# Patient Record
Sex: MALE | Race: WHITE | HISPANIC OR LATINO | Employment: UNEMPLOYED | URBAN - METROPOLITAN AREA
[De-identification: names, ages, dates, MRNs, and addresses within clinical notes are randomized per-mention and may not be internally consistent; named-entity substitution may affect disease eponyms.]

---

## 2017-12-05 ENCOUNTER — APPOINTMENT (OUTPATIENT)
Dept: CT IMAGING | Facility: HOSPITAL | Age: 22
End: 2017-12-05

## 2017-12-05 ENCOUNTER — HOSPITAL ENCOUNTER (INPATIENT)
Facility: HOSPITAL | Age: 22
LOS: 2 days | Discharge: HOME | End: 2017-12-07
Attending: EMERGENCY MEDICINE | Admitting: HOSPITALIST

## 2017-12-05 DIAGNOSIS — F13.930 BENZODIAZEPINE WITHDRAWAL WITHOUT COMPLICATION (HCC): ICD-10-CM

## 2017-12-05 DIAGNOSIS — R56.9 NEW ONSET SEIZURE (HCC): Primary | ICD-10-CM

## 2017-12-05 PROBLEM — L30.9 ECZEMA: Status: ACTIVE | Noted: 2017-12-05

## 2017-12-05 PROBLEM — D72.829 LEUKOCYTOSIS: Status: ACTIVE | Noted: 2017-12-05

## 2017-12-05 PROBLEM — F13.939 BENZODIAZEPINE WITHDRAWAL WITH COMPLICATION (HCC): Status: ACTIVE | Noted: 2017-12-05

## 2017-12-05 PROBLEM — F13.10 BENZODIAZEPINE ABUSE (HCC): Status: ACTIVE | Noted: 2017-12-05

## 2017-12-05 LAB
ALBUMIN SERPL-MCNC: 4.6 G/DL (ref 3.5–5.2)
ALBUMIN/GLOB SERPL: 1.6 G/DL
ALP SERPL-CCNC: 74 U/L (ref 39–117)
ALT SERPL W P-5'-P-CCNC: 11 U/L (ref 1–41)
AMPHET+METHAMPHET UR QL: NEGATIVE
ANION GAP SERPL CALCULATED.3IONS-SCNC: 12.7 MMOL/L
AST SERPL-CCNC: 22 U/L (ref 1–40)
BARBITURATES UR QL SCN: NEGATIVE
BASOPHILS # BLD AUTO: 0.02 10*3/MM3 (ref 0–0.2)
BASOPHILS NFR BLD AUTO: 0.1 % (ref 0–1.5)
BENZODIAZ UR QL SCN: POSITIVE
BILIRUB SERPL-MCNC: 0.5 MG/DL (ref 0.1–1.2)
BILIRUB UR QL STRIP: NEGATIVE
BUN BLD-MCNC: 10 MG/DL (ref 6–20)
BUN/CREAT SERPL: 12.7 (ref 7–25)
CALCIUM SPEC-SCNC: 9.5 MG/DL (ref 8.6–10.5)
CANNABINOIDS SERPL QL: POSITIVE
CHLORIDE SERPL-SCNC: 101 MMOL/L (ref 98–107)
CLARITY UR: CLEAR
CO2 SERPL-SCNC: 26.3 MMOL/L (ref 22–29)
COCAINE UR QL: NEGATIVE
COLOR UR: YELLOW
CREAT BLD-MCNC: 0.79 MG/DL (ref 0.76–1.27)
DEPRECATED RDW RBC AUTO: 39.2 FL (ref 37–54)
EOSINOPHIL # BLD AUTO: 0.02 10*3/MM3 (ref 0–0.7)
EOSINOPHIL NFR BLD AUTO: 0.1 % (ref 0.3–6.2)
ERYTHROCYTE [DISTWIDTH] IN BLOOD BY AUTOMATED COUNT: 12.2 % (ref 11.5–14.5)
ETHANOL BLD-MCNC: <10 MG/DL (ref 0–10)
ETHANOL UR QL: <0.01 %
GFR SERPL CREATININE-BSD FRML MDRD: 125 ML/MIN/1.73
GLOBULIN UR ELPH-MCNC: 2.9 GM/DL
GLUCOSE BLD-MCNC: 98 MG/DL (ref 65–99)
GLUCOSE UR STRIP-MCNC: NEGATIVE MG/DL
HCT VFR BLD AUTO: 45.9 % (ref 40.4–52.2)
HGB BLD-MCNC: 15.9 G/DL (ref 13.7–17.6)
HGB UR QL STRIP.AUTO: NEGATIVE
IMM GRANULOCYTES # BLD: 0.03 10*3/MM3 (ref 0–0.03)
IMM GRANULOCYTES NFR BLD: 0.2 % (ref 0–0.5)
KETONES UR QL STRIP: NEGATIVE
LEUKOCYTE ESTERASE UR QL STRIP.AUTO: NEGATIVE
LYMPHOCYTES # BLD AUTO: 1.13 10*3/MM3 (ref 0.9–4.8)
LYMPHOCYTES NFR BLD AUTO: 7.9 % (ref 19.6–45.3)
MCH RBC QN AUTO: 30.8 PG (ref 27–32.7)
MCHC RBC AUTO-ENTMCNC: 34.6 G/DL (ref 32.6–36.4)
MCV RBC AUTO: 89 FL (ref 79.8–96.2)
METHADONE UR QL SCN: NEGATIVE
MONOCYTES # BLD AUTO: 1.07 10*3/MM3 (ref 0.2–1.2)
MONOCYTES NFR BLD AUTO: 7.5 % (ref 5–12)
NEUTROPHILS # BLD AUTO: 11.96 10*3/MM3 (ref 1.9–8.1)
NEUTROPHILS NFR BLD AUTO: 84.2 % (ref 42.7–76)
NITRITE UR QL STRIP: NEGATIVE
OPIATES UR QL: NEGATIVE
OXYCODONE UR QL SCN: NEGATIVE
PH UR STRIP.AUTO: 8.5 [PH] (ref 5–8)
PLATELET # BLD AUTO: 214 10*3/MM3 (ref 140–500)
PMV BLD AUTO: 10 FL (ref 6–12)
POTASSIUM BLD-SCNC: 3.8 MMOL/L (ref 3.5–5.2)
PROT SERPL-MCNC: 7.5 G/DL (ref 6–8.5)
PROT UR QL STRIP: NEGATIVE
RBC # BLD AUTO: 5.16 10*6/MM3 (ref 4.6–6)
SODIUM BLD-SCNC: 140 MMOL/L (ref 136–145)
SP GR UR STRIP: 1.01 (ref 1–1.03)
UROBILINOGEN UR QL STRIP: ABNORMAL
WBC NRBC COR # BLD: 14.23 10*3/MM3 (ref 4.5–10.7)

## 2017-12-05 PROCEDURE — 80307 DRUG TEST PRSMV CHEM ANLYZR: CPT | Performed by: EMERGENCY MEDICINE

## 2017-12-05 PROCEDURE — 93005 ELECTROCARDIOGRAM TRACING: CPT | Performed by: EMERGENCY MEDICINE

## 2017-12-05 PROCEDURE — 81003 URINALYSIS AUTO W/O SCOPE: CPT | Performed by: EMERGENCY MEDICINE

## 2017-12-05 PROCEDURE — 25010000003 LEVETIRACETAM IN NACL 0.75% 1000 MG/100ML SOLUTION: Performed by: EMERGENCY MEDICINE

## 2017-12-05 PROCEDURE — 25010000002 LORAZEPAM PER 2 MG: Performed by: EMERGENCY MEDICINE

## 2017-12-05 PROCEDURE — 70450 CT HEAD/BRAIN W/O DYE: CPT

## 2017-12-05 PROCEDURE — 99284 EMERGENCY DEPT VISIT MOD MDM: CPT

## 2017-12-05 PROCEDURE — 85025 COMPLETE CBC W/AUTO DIFF WBC: CPT | Performed by: EMERGENCY MEDICINE

## 2017-12-05 PROCEDURE — 93010 ELECTROCARDIOGRAM REPORT: CPT | Performed by: INTERNAL MEDICINE

## 2017-12-05 PROCEDURE — 80053 COMPREHEN METABOLIC PANEL: CPT | Performed by: EMERGENCY MEDICINE

## 2017-12-05 RX ORDER — SODIUM CHLORIDE 450 MG/100ML
75 INJECTION, SOLUTION INTRAVENOUS CONTINUOUS
Status: DISCONTINUED | OUTPATIENT
Start: 2017-12-05 | End: 2017-12-07

## 2017-12-05 RX ORDER — DIAPER,BRIEF,INFANT-TODD,DISP
EACH MISCELLANEOUS EVERY 12 HOURS SCHEDULED
Status: DISCONTINUED | OUTPATIENT
Start: 2017-12-05 | End: 2017-12-07 | Stop reason: HOSPADM

## 2017-12-05 RX ORDER — ALPRAZOLAM 0.5 MG/1
0.25 TABLET ORAL 3 TIMES DAILY
Status: DISCONTINUED | OUTPATIENT
Start: 2017-12-05 | End: 2017-12-07 | Stop reason: HOSPADM

## 2017-12-05 RX ORDER — LORAZEPAM 2 MG/ML
1 INJECTION INTRAMUSCULAR ONCE
Status: COMPLETED | OUTPATIENT
Start: 2017-12-05 | End: 2017-12-05

## 2017-12-05 RX ORDER — LEVETIRACETAM 10 MG/ML
1000 INJECTION INTRAVASCULAR ONCE
Status: COMPLETED | OUTPATIENT
Start: 2017-12-05 | End: 2017-12-05

## 2017-12-05 RX ORDER — IBUPROFEN 400 MG/1
400 TABLET ORAL
COMMUNITY
Start: 2016-11-02

## 2017-12-05 RX ORDER — ACETAMINOPHEN 500 MG
1000 TABLET ORAL ONCE
Status: COMPLETED | OUTPATIENT
Start: 2017-12-05 | End: 2017-12-05

## 2017-12-05 RX ORDER — SODIUM CHLORIDE 0.9 % (FLUSH) 0.9 %
10 SYRINGE (ML) INJECTION AS NEEDED
Status: DISCONTINUED | OUTPATIENT
Start: 2017-12-05 | End: 2017-12-07 | Stop reason: HOSPADM

## 2017-12-05 RX ORDER — SODIUM CHLORIDE 0.9 % (FLUSH) 0.9 %
1-10 SYRINGE (ML) INJECTION AS NEEDED
Status: DISCONTINUED | OUTPATIENT
Start: 2017-12-05 | End: 2017-12-07 | Stop reason: HOSPADM

## 2017-12-05 RX ORDER — ACETAMINOPHEN 325 MG/1
650 TABLET ORAL EVERY 6 HOURS PRN
Status: DISCONTINUED | OUTPATIENT
Start: 2017-12-05 | End: 2017-12-07 | Stop reason: HOSPADM

## 2017-12-05 RX ADMIN — HYDROCORTISONE: 1 OINTMENT TOPICAL at 20:10

## 2017-12-05 RX ADMIN — LEVETIRACETAM 1000 MG: 10 INJECTION INTRAVENOUS at 14:56

## 2017-12-05 RX ADMIN — ALPRAZOLAM 0.25 MG: 0.5 TABLET ORAL at 20:09

## 2017-12-05 RX ADMIN — SODIUM CHLORIDE 100 ML/HR: 4.5 INJECTION, SOLUTION INTRAVENOUS at 18:27

## 2017-12-05 RX ADMIN — LORAZEPAM 1 MG: 2 INJECTION, SOLUTION INTRAMUSCULAR; INTRAVENOUS at 14:51

## 2017-12-05 RX ADMIN — ACETAMINOPHEN 1000 MG: 500 TABLET ORAL at 15:39

## 2017-12-05 RX ADMIN — SODIUM CHLORIDE 1000 ML: 9 INJECTION, SOLUTION INTRAVENOUS at 14:17

## 2017-12-05 RX ADMIN — ACETAMINOPHEN 650 MG: 325 TABLET ORAL at 22:32

## 2017-12-05 NOTE — ED NOTES
Patients bed sheets were changed, patient was given another warm blanket     Jessica Abarca  12/05/17 0148

## 2017-12-05 NOTE — ED PROVIDER NOTES
" EMERGENCY DEPARTMENT ENCOUNTER    CHIEF COMPLAINT  Chief Complaint: seizure-like activity   History given by: pt, girlfriend, parents  History limited by: none  Room Number: 23/23  PMD: No Known Provider      HPI:  Pt is a 22 y.o. male who presents complaining of seizure-like activity. Pt's girlfriend reports that the pt c/o dizziness, fell backwards, hit his head, and had generalized tremors and foaming at the mouth. Pt's girlfriend states that the pt did not speak and was confused for \"a while\" after his seizure-like activity. Pt denies remembering saying that he was dizzy or falling, and denies CP, SOA, or palpitations. Pt also c/o rash that started after rubbing cocoa butter on a new tattoo, and current mild lightheadedness. Pt denies a Hx of seizures. Pt states that he has been using xanax frequently, but not for the past week.    Duration:  Since earlier today   Onset: gradual   Timing: episodic   Location: generalized   Quality: seizure-like activity   Intensity/Severity: moderate   Progression: currently resolved   Associated Symptoms: rash, lightheadedness  Aggravating Factors: none stated   Alleviating Factors: none stated   Previous Episodes: none   Treatment before arrival: none     PAST MEDICAL HISTORY  Active Ambulatory Problems     Diagnosis Date Noted   • No Active Ambulatory Problems     Resolved Ambulatory Problems     Diagnosis Date Noted   • No Resolved Ambulatory Problems     No Additional Past Medical History       PAST SURGICAL HISTORY  Past Surgical History:   Procedure Laterality Date   • TONSILLECTOMY     • WRIST SURGERY         FAMILY HISTORY  History reviewed. No pertinent family history.    SOCIAL HISTORY  Social History     Social History   • Marital status: Single     Spouse name: N/A   • Number of children: N/A   • Years of education: N/A     Occupational History   • Not on file.     Social History Main Topics   • Smoking status: Former Smoker   • Smokeless tobacco: Never Used   • " Alcohol use Yes      Comment: occ   • Drug use: No   • Sexual activity: Defer     Other Topics Concern   • Not on file     Social History Narrative   • No narrative on file       ALLERGIES  Review of patient's allergies indicates no known allergies.    REVIEW OF SYSTEMS  Review of Systems   Constitutional: Negative for activity change, appetite change and fever.   HENT: Negative for congestion and sore throat.    Respiratory: Negative for cough and shortness of breath.    Cardiovascular: Negative for chest pain and leg swelling.   Gastrointestinal: Negative for abdominal pain, diarrhea and vomiting.   Genitourinary: Negative for decreased urine volume and dysuria.   Musculoskeletal: Negative for neck pain.   Skin: Positive for rash. Negative for wound.   Neurological: Positive for seizures and light-headedness (mild). Negative for weakness, numbness and headaches.   Psychiatric/Behavioral: Negative.    All other systems reviewed and are negative.      PHYSICAL EXAM  ED Triage Vitals   Temp Heart Rate Resp BP SpO2   12/05/17 1241 12/05/17 1241 12/05/17 1241 12/05/17 1306 12/05/17 1241   96.7 °F (35.9 °C) 85 18 122/79 98 %      Temp src Heart Rate Source Patient Position BP Location FiO2 (%)   12/05/17 1241 -- 12/05/17 1306 -- --   Tympanic  Sitting         Physical Exam   Constitutional: He is oriented to person, place, and time and well-developed, well-nourished, and in no distress.   HENT:   Head: Normocephalic and atraumatic.   Eyes: EOM are normal. Pupils are equal, round, and reactive to light.   Neck: Normal range of motion. Neck supple.   Cardiovascular: Normal rate, regular rhythm and normal heart sounds.    Pulmonary/Chest: Effort normal and breath sounds normal. No respiratory distress.   Abdominal: Soft. There is no tenderness. There is no rebound and no guarding.   Musculoskeletal: Normal range of motion. He exhibits no edema.   Neurological: He is alert and oriented to person, place, and time. He has  normal sensation and normal strength.   Skin: Skin is warm and dry. Bruising (hematoma to L occipital scalp) and rash (to forearms, face, and abd, consistent with psoriasis) noted.   Psychiatric: Mood and affect normal.   Nursing note and vitals reviewed.      LAB RESULTS  Lab Results (last 24 hours)     Procedure Component Value Units Date/Time    CBC & Differential [[id number]] Collected:  12/05/17 1415    Specimen:  Blood Updated:  12/05/17 1425    Narrative:       The following orders were created for panel order CBC & Differential.  Procedure                               Abnormality         Status                     ---------                               -----------         ------                     CBC Auto Differential[[id number]]        Abnormal            Final result                 Please view results for these tests on the individual orders.    Comprehensive Metabolic Panel [[id number]] Collected:  12/05/17 1415    Specimen:  Blood Updated:  12/05/17 1447     Glucose 98 mg/dL      BUN 10 mg/dL      Creatinine 0.79 mg/dL      Sodium 140 mmol/L      Potassium 3.8 mmol/L      Chloride 101 mmol/L      CO2 26.3 mmol/L      Calcium 9.5 mg/dL      Total Protein 7.5 g/dL      Albumin 4.60 g/dL      ALT (SGPT) 11 U/L      AST (SGOT) 22 U/L      Alkaline Phosphatase 74 U/L      Total Bilirubin 0.5 mg/dL      eGFR Non African Amer 125 mL/min/1.73      Globulin 2.9 gm/dL      A/G Ratio 1.6 g/dL      BUN/Creatinine Ratio 12.7     Anion Gap 12.7 mmol/L     Ethanol [[id number]] Collected:  12/05/17 1415    Specimen:  Blood Updated:  12/05/17 1447     Ethanol <10 mg/dL      Ethanol % <0.010 %     CBC Auto Differential [[id number]]  (Abnormal) Collected:  12/05/17 1415    Specimen:  Blood Updated:  12/05/17 1425     WBC 14.23 (H) 10*3/mm3      RBC 5.16 10*6/mm3      Hemoglobin 15.9 g/dL      Hematocrit 45.9 %      MCV 89.0 fL      MCH 30.8 pg      MCHC 34.6 g/dL      RDW 12.2 %      RDW-SD 39.2 fl      MPV 10.0 fL       Platelets 214 10*3/mm3      Neutrophil % 84.2 (H) %      Lymphocyte % 7.9 (L) %      Monocyte % 7.5 %      Eosinophil % 0.1 (L) %      Basophil % 0.1 %      Immature Grans % 0.2 %      Neutrophils, Absolute 11.96 (H) 10*3/mm3      Lymphocytes, Absolute 1.13 10*3/mm3      Monocytes, Absolute 1.07 10*3/mm3      Eosinophils, Absolute 0.02 10*3/mm3      Basophils, Absolute 0.02 10*3/mm3      Immature Grans, Absolute 0.03 10*3/mm3     Urinalysis With / Culture If Indicated - Urine, Clean Catch [[id number]]  (Abnormal) Collected:  12/05/17 1418    Specimen:  Urine from Urine, Clean Catch Updated:  12/05/17 1434     Color, UA Yellow     Appearance, UA Clear     pH, UA 8.5 (H)     Specific Gravity, UA 1.009     Glucose, UA Negative     Ketones, UA Negative     Bilirubin, UA Negative     Blood, UA Negative     Protein, UA Negative     Leuk Esterase, UA Negative     Nitrite, UA Negative     Urobilinogen, UA 0.2 E.U./dL    Narrative:       Urine microscopic not indicated.    Urine Drug Screen - Urine, Clean Catch [[id number]]  (Abnormal) Collected:  12/05/17 1418    Specimen:  Urine from Urine, Clean Catch Updated:  12/05/17 1453     Amphet/Methamphet, Screen Negative     Barbiturates Screen, Urine Negative     Benzodiazepine Screen, Urine Positive (A)     Cocaine Screen, Urine Negative     Opiate Screen Negative     THC, Screen, Urine Positive (A)     Methadone Screen, Urine Negative     Oxycodone Screen, Urine Negative    Narrative:       Negative Thresholds For Drugs Screened:     Amphetamines               500 ng/ml   Barbiturates               200 ng/ml   Benzodiazepines            100 ng/ml   Cocaine                    300 ng/ml   Methadone                  300 ng/ml   Opiates                    300 ng/ml   Oxycodone                  100 ng/ml   THC                        50 ng/ml    The Normal Value for all drugs tested is negative. This report includes final unconfirmed screening results to be used for medical treatment  purposes only. Unconfirmed results must not be used for non-medical purposes such as employment or legal testing. Clinical consideration should be applied to any drug of abuse test, particulary when unconfirmed results are used.          I ordered the above labs and reviewed the results    RADIOLOGY  CT Head Without Contrast   Preliminary Result   No acute process identified. Further evaluation with a MRI   examination of the brain is recommended, particularly if the patient has   a history of new onset seizure activity.       The above information was called to Dr. Jansen.               Radiation dose reduction techniques were utilized, including automated   exposure control and exposure modulation based on body size.                   I ordered the above noted radiological studies. Interpreted by radiologist. Reviewed by me in PACS.       PROCEDURES  Procedures      PROGRESS AND CONSULTS  ED Course   1404  Ordered orthostatic vitals for further evaluation.   1449  Pt began seizing. Dr. Jansen is in the pt's room.  1452  Rechecked pt, who is no longer seizing. Pt is now awake and mildly confused. Pt's seizure lasted approximately 60 seconds.   1623  Received a call from Dr. Zamora and discussed pt's case. Dr. Zamora agreed to admit the pt.    MEDICAL DECISION MAKING  Results were reviewed/discussed with the patient and they were also made aware of online access. Pt also made aware that some labs, such as cultures, will not be resulted during ER visit and follow up with PMD is necessary.     MDM  Number of Diagnoses or Management Options  Benzodiazepine withdrawal without complication:   New onset seizure:      Amount and/or Complexity of Data Reviewed  Clinical lab tests: reviewed and ordered (Benzo U: positive, THC screen: positive, EtOH <10, WBC 14.23)  Tests in the radiology section of CPT®: reviewed and ordered (CT head: negative)  Discuss the patient with other providers: yes (  Noah)    Patient Progress  Patient progress: stable         DIAGNOSIS  Final diagnoses:   New onset seizure   Benzodiazepine withdrawal without complication       DISPOSITION  ADMISSION by Dr. Zamora    Discussed treatment plan and reason for admission with pt/family and admitting physician.  Pt/family voiced understanding of the plan for admission for further testing/treatment as needed.     Latest Documented Vital Signs:  As of 4:52 PM  BP- (!) 185/53 HR- 87 Temp- 96.7 °F (35.9 °C) (Tympanic) O2 sat- 96%    --  Documentation assistance provided by carissa Lemons for ANAM Nolan.  Information recorded by the carissa was done at my direction and has been verified and validated by me.       Levi Lemons  12/05/17 1652       ANAM Nogueira  12/05/17 1951

## 2017-12-05 NOTE — ED PROVIDER NOTES
Patient presents to the ED after experiencing dizziness and subsequent tonic-clonic activity.     1450: Patient had a 1 minute tonic clonic episode that resolved after 1mg of ativan. Tech at bedside states that patient was talking on the phone prior to the episode. He has recently been smoking marijuana and eating xanax bars (last bar about 3 days ago), per family member. Pt is now post ictal, moving all extremities, confused and agitated.     PHYSICAL EXAM    Cardio: tachycardic without murmurs  Pulmonary: CTAB  Abdominal: nontender, nondistended with normal active bowel sounds  Skin: diaphoretic; track syed in left distal forearm  Neuro: Post ictal moving all extremities    EKG           EKG time: 1358  Rhythm/Rate: NSR rate69  P waves and OK: nml  QRS, axis: LVH    ST and T waves: RBBB     Interpreted Contemporaneously by me, independently viewed  No old for comparison    Plan to admit for xanax withdrawal.  I supervised care provided by the midlevel provider.    We have discussed this patient's history, physical exam, and treatment plan.   I have reviewed the note and personally saw and examined the patient and agree with the plan of care.    Documentation assistance provided by carissa Li for .  Information recorded by the carissa was done at my direction and has been verified and validated by me.         Velvet Li  12/05/17 1520       Darrell Jansen MD  12/05/17 2002

## 2017-12-05 NOTE — ED TRIAGE NOTES
"Family reported patient was standing there and fell backward after he said he was dizzy, witness stated he hit the back of his head and had seizure like activity. Patient stated he feels dizzy still and nausea.\"   "

## 2017-12-05 NOTE — ED NOTES
Patient complaining of severe back pain, Dr. Jansen notified and new ordered for PO tylenol given.      Claribel Martinez RN  12/05/17 4557

## 2017-12-05 NOTE — ED NOTES
Patient girlfriend ran out of room into [facility] yelling for help, upon this RN entering room patient noted in full tonic-clonic seizure activity lasting approx 1 min. Patient had blood and saliva coming from his mouth with neck hyperextended. O2 applied at 6L and patient orally suctioned once seizure activity complete, sats 98% on 6L. Patient given 1mg IV ativan per verbal order from Dr. Jansen. Patient post ictal and confused with some agitation noted. Staff at bedside to prevent injury and keep patient safe. IV secured using long arm board and tape while keppra is administered. Family at bedside with patient. Seizure precautions remain in place.      Claribel Martinez RN  12/05/17 5305

## 2017-12-06 ENCOUNTER — APPOINTMENT (OUTPATIENT)
Dept: MRI IMAGING | Facility: HOSPITAL | Age: 22
End: 2017-12-06
Attending: HOSPITALIST

## 2017-12-06 LAB
ANION GAP SERPL CALCULATED.3IONS-SCNC: 9.4 MMOL/L
BUN BLD-MCNC: 8 MG/DL (ref 6–20)
BUN/CREAT SERPL: 10.8 (ref 7–25)
CALCIUM SPEC-SCNC: 8.5 MG/DL (ref 8.6–10.5)
CHLORIDE SERPL-SCNC: 105 MMOL/L (ref 98–107)
CO2 SERPL-SCNC: 26.6 MMOL/L (ref 22–29)
CREAT BLD-MCNC: 0.74 MG/DL (ref 0.76–1.27)
DEPRECATED RDW RBC AUTO: 41 FL (ref 37–54)
ERYTHROCYTE [DISTWIDTH] IN BLOOD BY AUTOMATED COUNT: 12.3 % (ref 11.5–14.5)
GFR SERPL CREATININE-BSD FRML MDRD: 135 ML/MIN/1.73
GLUCOSE BLD-MCNC: 96 MG/DL (ref 65–99)
HCT VFR BLD AUTO: 44.6 % (ref 40.4–52.2)
HGB BLD-MCNC: 14.9 G/DL (ref 13.7–17.6)
MCH RBC QN AUTO: 30.4 PG (ref 27–32.7)
MCHC RBC AUTO-ENTMCNC: 33.4 G/DL (ref 32.6–36.4)
MCV RBC AUTO: 91 FL (ref 79.8–96.2)
PLATELET # BLD AUTO: 194 10*3/MM3 (ref 140–500)
PMV BLD AUTO: 10.4 FL (ref 6–12)
POTASSIUM BLD-SCNC: 3.7 MMOL/L (ref 3.5–5.2)
RBC # BLD AUTO: 4.9 10*6/MM3 (ref 4.6–6)
SODIUM BLD-SCNC: 141 MMOL/L (ref 136–145)
WBC NRBC COR # BLD: 12.33 10*3/MM3 (ref 4.5–10.7)

## 2017-12-06 PROCEDURE — 0 GADOBENATE DIMEGLUMINE 529 MG/ML SOLUTION: Performed by: HOSPITALIST

## 2017-12-06 PROCEDURE — 80048 BASIC METABOLIC PNL TOTAL CA: CPT | Performed by: HOSPITALIST

## 2017-12-06 PROCEDURE — 90791 PSYCH DIAGNOSTIC EVALUATION: CPT | Performed by: SOCIAL WORKER

## 2017-12-06 PROCEDURE — 99254 IP/OBS CNSLTJ NEW/EST MOD 60: CPT | Performed by: PSYCHIATRY & NEUROLOGY

## 2017-12-06 PROCEDURE — 85027 COMPLETE CBC AUTOMATED: CPT | Performed by: HOSPITALIST

## 2017-12-06 PROCEDURE — A9577 INJ MULTIHANCE: HCPCS | Performed by: HOSPITALIST

## 2017-12-06 PROCEDURE — 70553 MRI BRAIN STEM W/O & W/DYE: CPT

## 2017-12-06 RX ADMIN — ALPRAZOLAM 0.25 MG: 0.5 TABLET ORAL at 21:41

## 2017-12-06 RX ADMIN — ALPRAZOLAM 0.25 MG: 0.5 TABLET ORAL at 07:50

## 2017-12-06 RX ADMIN — SODIUM CHLORIDE 100 ML/HR: 4.5 INJECTION, SOLUTION INTRAVENOUS at 03:31

## 2017-12-06 RX ADMIN — HYDROCORTISONE: 1 OINTMENT TOPICAL at 07:50

## 2017-12-06 RX ADMIN — HYDROCORTISONE: 1 OINTMENT TOPICAL at 21:41

## 2017-12-06 RX ADMIN — ALPRAZOLAM 0.25 MG: 0.5 TABLET ORAL at 17:09

## 2017-12-06 RX ADMIN — GADOBENATE DIMEGLUMINE 15 ML: 529 INJECTION, SOLUTION INTRAVENOUS at 13:16

## 2017-12-06 RX ADMIN — ACETAMINOPHEN 650 MG: 325 TABLET ORAL at 05:47

## 2017-12-06 NOTE — PAYOR COMM NOTE
"UR CONTACT:  LAWRENCE                     P: [phone]  F: [phone]          Adama Adhikari (22 y.o. Male)     Date of Birth Social Security Number Address Home Phone MRN    1995  [address]  [place] [address] [phone] [id number]    Jainism Marital Status          None Single       Admission Date Admission Type Admitting Provider Attending Provider Department, Room/Bed    17 Emergency Brenda Zamora MD Nguyen, Aric Cardoza MD [address], P591/1    Discharge Date Discharge Disposition Discharge Destination                      Attending Provider: Aric Bunch MD     Allergies:  No Known Allergies    Isolation:  None   Infection:  None   Code Status:  FULL    Ht:  188 cm (74\")   Wt:  72.6 kg (160 lb)    Admission Cmt:  None   Principal Problem:  New onset seizure [R56.9]                 Active Insurance as of 2017     Primary Coverage     Payor Plan Insurance Group Employer/Plan Group    PASSPORT PASSPORT MEDICAID     Payor Plan Address Payor Plan Phone Number Effective From Effective To    [address] [phone] [address]     [place] [address]       Subscriber Name Subscriber Birth Date Member ID       ADAMA ADHIKARI 1995 [id number]                 Emergency Contacts      (Rel.) Home Phone Work Phone Mobile Phone    Radhika Adhikari (Mother) [phone] -- [phone]               History & Physical      Brenda Zamora MD at 2017  7:49 PM                            [facility]    Patient Identification:  Name: Adama Adhikari  Age/Sex: 22 y.o. male  :  1995  MRN: [id number]         Primary Care Physician: No Known Provider    Chief Complaint   Patient presents with   • Seizures     WITNESSED SZ LASTING APPROX. 2MIN, NO KNOWN HISTORY; PT NOW REPORTS FEELING DIZZY         History of Present Illness  Patient is a 22 y.o. male who presents with a witnessed seizure at home " earlier today.  The patient's girlfriend said he was sitting on the bed, stared blankly, fell back and then began with tonic-clonic movements of his arms and legs and increased salivation.  He was disoriented and not talking after the event for about 20 minutes.  EMS brought into the hospital.  In the emergency room he had another witnessed seizure that lasted for approximately a minute.  It was similar to before but this time he had some trauma to his tongue.  There was a postictal period of about 5 minutes.  Currently he is awake and not complaining of any complaints other than he wants to go home.  He denies any previous history of any seizure disorder.  He has been taking 2 mg Xanax bars for the last couple of months.  He stopped taking these between 3 days to a week depending upon who elicits information from him.  He denies the use of intravenous drugs, opiates, skin popping, methamphetamine.  He denies regular alcohol use but does smoke marijuana occasionally.  He stopped trying to use the Xanax because friends were getting arrested.    He has a history of some up.  He has numerous circular areas on his hands and arms face and legs that are erythematous with some scaling without secondary infection.  Some of these are pruritic others or not.  He denies using any topical agent for these.    History reviewed. No pertinent past medical history.  Past Surgical History:   Procedure Laterality Date   • TONSILLECTOMY     • WRIST SURGERY       History reviewed. No pertinent family history.  Social History   Substance Use Topics   • Smoking status: Former Smoker   • Smokeless tobacco: Never Used   • Alcohol use Yes      Comment: occ     Prescriptions Prior to Admission   Medication Sig Dispense Refill Last Dose   • ibuprofen (ADVIL,MOTRIN) 400 MG tablet Take 400 mg by mouth        Allergies:  Review of patient's allergies indicates no known allergies.    Review Of Systems:  CONSTITUTIONAL: Denies fever or weight loss  or gain  H EENT: Denies visual or auditory changes  ENDOCRINE: Denies symptoms suggestive of thyroid disease or diabetes  PULMONARY: Denies cough, dyspnea, wheezing.  Denies asthma  CARDIAC: Denies palpitations, chest pain, valvular disease, or rhythm disturbance  GI: Denies dysphasia, change in bowel pattern, abdominal pain, fatty food intolerance.    : Denies dysuria, frequency, urgency, hematuria.  MUSCULOSKELETAL: Denies gout   HEM/ONC: Denies anemia, bleeding or DVT  The rest of the review of systems are listed in the history of present illness.    Vital Signs  Temp:  [96.7 °F (35.9 °C)-98.5 °F (36.9 °C)] 98.5 °F (36.9 °C)  Heart Rate:  [71-97] 97  Resp:  [18] 18  BP: (120-185)/(53-79) 129/72  Body mass index is 20.54 kg/(m^2).    .GENERAL: The patient is a  white male who appears his stated age.  He makes good eye contact but does answer questions cautiously  HEENT: PERRLA, pupils are 4 mm in size, EOMI, there are 2 areas of buccal mucosa trauma that or a couple millimeters in size with erythema and small amount of ecchymoses.  The right side of his tongue has an abrasion in a V shape 3 mm one side 4 mm on other side.  There are multiple dry scaly areas over his forehead and cheeks that looked like they've and chronically picked but without secondary infection  NECK: Thyroid is not enlarged, ROM normal, no lymphadenopathy  CHEST: Normal shape and expansion, no retractions or tenderness  LUNGS: Clear  HEART:Regular rate and rhythm, no murmur   ABDOMEN: Soft, nondistended, and nontender with normal bowel sounds.  No masses or organomegaly  EXTREMITIES: No edema or cyanosis, good range of motion of elbows, hips, knees.  There are multiple circular areas over his hands abdomen and legs with scaling without secondary infection.  NEUROLOGIC: He is alert and  oriented ×3, muscle strength in upper and lower extremities is equal in all areas  RECTAL/SCROTAL: Not performed  BACK: Normal    Results Review:    I  "reviewed the patient's new clinical results.      Results from last 7 days  Lab Units 12/05/17  1415   WBC 10*3/mm3 14.23*   HEMOGLOBIN g/dL 15.9   PLATELETS 10*3/mm3 214       Results from last 7 days  Lab Units 12/05/17  1415   SODIUM mmol/L 140   POTASSIUM mmol/L 3.8   CHLORIDE mmol/L 101   CO2 mmol/L 26.3   BUN mg/dL 10   CREATININE mg/dL 0.79   CALCIUM mg/dL 9.5   GLUCOSE mg/dL 98         Hemoglobin A1C:No results found for: HGBA1C    Glucose Range:No results found for: POCGLU    Assessment/Plan     Principal Problem:    New onset seizure  Active Problems:    Eczema    Benzodiazepine withdrawal with complication    Benzodiazepine abuse    Leukocytosis      Assessment & Plan  New-onset seizure secondary to benzodiazepine withdrawal: Oral Xanax to be given 3 times a day at a low dose with plans to titration to prevent seizures.  This does not need anticonvulsive agent    Eczema: Start topical hydrocortisone    Leukocytosis: secondary to current event recheck in a.m.    Benzodiazepine abuse: To be seen by access      I discussed the patients findings and my recommendations with patient.          Brenda Zamora MD  [facility]  12/05/17  8:05 PM         Electronically signed by Brenda Zamora MD at 12/5/2017  8:05 PM           Emergency Department Notes      Bart Malcolm RN at 12/5/2017  1:04 PM          Family reported patient was standing there and fell backward after he said he was dizzy, witness stated he hit the back of his head and had seizure like activity. Patient stated he feels dizzy still and nausea.\"      Electronically signed by Bart Malcolm RN at 12/5/2017  1:06 PM      ANAM Nogueira at 12/5/2017  1:55 PM     Attestation signed by Darrell Jansen MD at 12/5/2017  8:03 PM              I supervised care provided by the midlevel provider.   We have discussed this patient's history, physical exam, and treatment plan.  I have reviewed the note and " "personally saw and examined the patient and agree with the plan of care. See attending note.   Darrell Jansen MD 12/5/2017 8:03 PM                                EMERGENCY DEPARTMENT ENCOUNTER    CHIEF COMPLAINT  Chief Complaint: seizure-like activity   History given by: pt, girlfriend, parents  History limited by: none  Room Number: 23/23  PMD: No Known Provider      HPI:  Pt is a 22 y.o. male who presents complaining of seizure-like activity. Pt's girlfriend reports that the pt c/o dizziness, fell backwards, hit his head, and had generalized tremors and foaming at the mouth. Pt's girlfriend states that the pt did not speak and was confused for \"a while\" after his seizure-like activity. Pt denies remembering saying that he was dizzy or falling, and denies CP, SOA, or palpitations. Pt also c/o rash that started after rubbing cocoa butter on a new tattoo, and current mild lightheadedness. Pt denies a Hx of seizures. Pt states that he has been using xanax frequently, but not for the past week.    Duration:  Since earlier today   Onset: gradual   Timing: episodic   Location: generalized   Quality: seizure-like activity   Intensity/Severity: moderate   Progression: currently resolved   Associated Symptoms: rash, lightheadedness  Aggravating Factors: none stated   Alleviating Factors: none stated   Previous Episodes: none   Treatment before arrival: none     PAST MEDICAL HISTORY  Active Ambulatory Problems     Diagnosis Date Noted   • No Active Ambulatory Problems     Resolved Ambulatory Problems     Diagnosis Date Noted   • No Resolved Ambulatory Problems     No Additional Past Medical History       PAST SURGICAL HISTORY  Past Surgical History:   Procedure Laterality Date   • TONSILLECTOMY     • WRIST SURGERY         FAMILY HISTORY  History reviewed. No pertinent family history.    SOCIAL HISTORY  Social History     Social History   • Marital status: Single     Spouse name: N/A   • Number of children: N/A   • Years " of education: N/A     Occupational History   • Not on file.     Social History Main Topics   • Smoking status: Former Smoker   • Smokeless tobacco: Never Used   • Alcohol use Yes      Comment: occ   • Drug use: No   • Sexual activity: Defer     Other Topics Concern   • Not on file     Social History Narrative   • No narrative on file       ALLERGIES  Review of patient's allergies indicates no known allergies.    REVIEW OF SYSTEMS  Review of Systems   Constitutional: Negative for activity change, appetite change and fever.   HENT: Negative for congestion and sore throat.    Respiratory: Negative for cough and shortness of breath.    Cardiovascular: Negative for chest pain and leg swelling.   Gastrointestinal: Negative for abdominal pain, diarrhea and vomiting.   Genitourinary: Negative for decreased urine volume and dysuria.   Musculoskeletal: Negative for neck pain.   Skin: Positive for rash. Negative for wound.   Neurological: Positive for seizures and light-headedness (mild). Negative for weakness, numbness and headaches.   Psychiatric/Behavioral: Negative.    All other systems reviewed and are negative.      PHYSICAL EXAM  ED Triage Vitals   Temp Heart Rate Resp BP SpO2   12/05/17 1241 12/05/17 1241 12/05/17 1241 12/05/17 1306 12/05/17 1241   96.7 °F (35.9 °C) 85 18 122/79 98 %      Temp src Heart Rate Source Patient Position BP Location FiO2 (%)   12/05/17 1241 -- 12/05/17 1306 -- --   Tympanic  Sitting         Physical Exam   Constitutional: He is oriented to person, place, and time and well-developed, well-nourished, and in no distress.   HENT:   Head: Normocephalic and atraumatic.   Eyes: EOM are normal. Pupils are equal, round, and reactive to light.   Neck: Normal range of motion. Neck supple.   Cardiovascular: Normal rate, regular rhythm and normal heart sounds.    Pulmonary/Chest: Effort normal and breath sounds normal. No respiratory distress.   Abdominal: Soft. There is no tenderness. There is no  rebound and no guarding.   Musculoskeletal: Normal range of motion. He exhibits no edema.   Neurological: He is alert and oriented to person, place, and time. He has normal sensation and normal strength.   Skin: Skin is warm and dry. Bruising (hematoma to L occipital scalp) and rash (to forearms, face, and abd, consistent with psoriasis) noted.   Psychiatric: Mood and affect normal.   Nursing note and vitals reviewed.      LAB RESULTS  Lab Results (last 24 hours)     Procedure Component Value Units Date/Time    CBC & Differential [[id number]] Collected:  12/05/17 1415    Specimen:  Blood Updated:  12/05/17 1425    Narrative:       The following orders were created for panel order CBC & Differential.  Procedure                               Abnormality         Status                     ---------                               -----------         ------                     CBC Auto Differential[[id number]]        Abnormal            Final result                 Please view results for these tests on the individual orders.    Comprehensive Metabolic Panel [[id number]] Collected:  12/05/17 1415    Specimen:  Blood Updated:  12/05/17 1447     Glucose 98 mg/dL      BUN 10 mg/dL      Creatinine 0.79 mg/dL      Sodium 140 mmol/L      Potassium 3.8 mmol/L      Chloride 101 mmol/L      CO2 26.3 mmol/L      Calcium 9.5 mg/dL      Total Protein 7.5 g/dL      Albumin 4.60 g/dL      ALT (SGPT) 11 U/L      AST (SGOT) 22 U/L      Alkaline Phosphatase 74 U/L      Total Bilirubin 0.5 mg/dL      eGFR Non African Amer 125 mL/min/1.73      Globulin 2.9 gm/dL      A/G Ratio 1.6 g/dL      BUN/Creatinine Ratio 12.7     Anion Gap 12.7 mmol/L     Ethanol [[id number]] Collected:  12/05/17 1415    Specimen:  Blood Updated:  12/05/17 1447     Ethanol <10 mg/dL      Ethanol % <0.010 %     CBC Auto Differential [[id number]]  (Abnormal) Collected:  12/05/17 1415    Specimen:  Blood Updated:  12/05/17 1425     WBC 14.23 (H) 10*3/mm3      RBC 5.16  10*6/mm3      Hemoglobin 15.9 g/dL      Hematocrit 45.9 %      MCV 89.0 fL      MCH 30.8 pg      MCHC 34.6 g/dL      RDW 12.2 %      RDW-SD 39.2 fl      MPV 10.0 fL      Platelets 214 10*3/mm3      Neutrophil % 84.2 (H) %      Lymphocyte % 7.9 (L) %      Monocyte % 7.5 %      Eosinophil % 0.1 (L) %      Basophil % 0.1 %      Immature Grans % 0.2 %      Neutrophils, Absolute 11.96 (H) 10*3/mm3      Lymphocytes, Absolute 1.13 10*3/mm3      Monocytes, Absolute 1.07 10*3/mm3      Eosinophils, Absolute 0.02 10*3/mm3      Basophils, Absolute 0.02 10*3/mm3      Immature Grans, Absolute 0.03 10*3/mm3     Urinalysis With / Culture If Indicated - Urine, Clean Catch [[id number]]  (Abnormal) Collected:  12/05/17 1418    Specimen:  Urine from Urine, Clean Catch Updated:  12/05/17 1434     Color, UA Yellow     Appearance, UA Clear     pH, UA 8.5 (H)     Specific Gravity, UA 1.009     Glucose, UA Negative     Ketones, UA Negative     Bilirubin, UA Negative     Blood, UA Negative     Protein, UA Negative     Leuk Esterase, UA Negative     Nitrite, UA Negative     Urobilinogen, UA 0.2 E.U./dL    Narrative:       Urine microscopic not indicated.    Urine Drug Screen - Urine, Clean Catch [[id number]]  (Abnormal) Collected:  12/05/17 1418    Specimen:  Urine from Urine, Clean Catch Updated:  12/05/17 1453     Amphet/Methamphet, Screen Negative     Barbiturates Screen, Urine Negative     Benzodiazepine Screen, Urine Positive (A)     Cocaine Screen, Urine Negative     Opiate Screen Negative     THC, Screen, Urine Positive (A)     Methadone Screen, Urine Negative     Oxycodone Screen, Urine Negative    Narrative:       Negative Thresholds For Drugs Screened:     Amphetamines               500 ng/ml   Barbiturates               200 ng/ml   Benzodiazepines            100 ng/ml   Cocaine                    300 ng/ml   Methadone                  300 ng/ml   Opiates                    300 ng/ml   Oxycodone                  100 ng/ml   THC                         50 ng/ml    The Normal Value for all drugs tested is negative. This report includes final unconfirmed screening results to be used for medical treatment purposes only. Unconfirmed results must not be used for non-medical purposes such as employment or legal testing. Clinical consideration should be applied to any drug of abuse test, particulary when unconfirmed results are used.          I ordered the above labs and reviewed the results    RADIOLOGY  CT Head Without Contrast   Preliminary Result   No acute process identified. Further evaluation with a MRI   examination of the brain is recommended, particularly if the patient has   a history of new onset seizure activity.       The above information was called to Dr. Jansen.               Radiation dose reduction techniques were utilized, including automated   exposure control and exposure modulation based on body size.                   I ordered the above noted radiological studies. Interpreted by radiologist. Reviewed by me in PACS.       PROCEDURES  Procedures      PROGRESS AND CONSULTS  ED Course   1404  Ordered orthostatic vitals for further evaluation.   1449  Pt began seizing. Dr. Jansen is in the pt's room.  1452  Rechecked pt, who is no longer seizing. Pt is now awake and mildly confused. Pt's seizure lasted approximately 60 seconds.   1623  Received a call from Dr. Zamora and discussed pt's case. Dr. Zamora agreed to admit the pt.    MEDICAL DECISION MAKING  Results were reviewed/discussed with the patient and they were also made aware of online access. Pt also made aware that some labs, such as cultures, will not be resulted during ER visit and follow up with PMD is necessary.     MDM  Number of Diagnoses or Management Options  Benzodiazepine withdrawal without complication:   New onset seizure:      Amount and/or Complexity of Data Reviewed  Clinical lab tests: reviewed and ordered (Benzo U: positive, THC screen: positive, EtOH  <10, WBC 14.23)  Tests in the radiology section of CPT®:  reviewed and ordered (CT head: negative)  Discuss the patient with other providers: yes (Dr. Zamora)    Patient Progress  Patient progress: stable         DIAGNOSIS  Final diagnoses:   New onset seizure   Benzodiazepine withdrawal without complication       DISPOSITION  ADMISSION by Dr. Zamora    Discussed treatment plan and reason for admission with pt/family and admitting physician.  Pt/family voiced understanding of the plan for admission for further testing/treatment as needed.     Latest Documented Vital Signs:  As of 4:52 PM  BP- (!) 185/53 HR- 87 Temp- 96.7 °F (35.9 °C) (Tympanic) O2 sat- 96%    --  Documentation assistance provided by carissa Lemons for ANAM Nolan.  Information recorded by the scribe was done at my direction and has been verified and validated by me.       Levi Lemons  12/05/17 1652       ANAM Nogueira  12/05/17 1951       Electronically signed by Darrell Jansen MD at 12/5/2017  8:03 PM      Claribel Martinez RN at 12/5/2017  2:50 PM          Patient girlfriend ran out of room into [facility] for help, upon this RN entering room patient noted in full tonic-clonic seizure activity lasting approx 1 min. Patient had blood and saliva coming from his mouth with neck hyperextended. O2 applied at 6L and patient orally suctioned once seizure activity complete, sats 98% on 6L. Patient given 1mg IV ativan per verbal order from Dr. Jansen. Patient post ictal and confused with some agitation noted. Staff at bedside to prevent injury and keep patient safe. IV secured using long arm board and tape while keppra is administered. Family at bedside with patient. Seizure precautions remain in place.      Claribel Martinez RN  12/05/17 1518       Electronically signed by Claribel Martinez RN at 12/5/2017  3:18 PM      Darrell Jansen MD at 12/5/2017  2:50 PM          Patient presents to the ED after experiencing dizziness and  subsequent tonic-clonic activity.     1450: Patient had a 1 minute tonic clonic episode that resolved after 1mg of ativan. Tech at bedside states that patient was talking on the phone prior to the episode. He has recently been smoking marijuana and eating xanax bars (last bar about 3 days ago), per family member. Pt is now post ictal, moving all extremities, confused and agitated.     PHYSICAL EXAM    Cardio: tachycardic without murmurs  Pulmonary: CTAB  Abdominal: nontender, nondistended with normal active bowel sounds  Skin: diaphoretic; track syed in left distal forearm  Neuro: Post ictal moving all extremities    EKG           EKG time: 1358  Rhythm/Rate: NSR rate69  P waves and IL: nml  QRS, axis: LVH    ST and T waves: RBBB     Interpreted Contemporaneously by me, independently viewed  No old for comparison    Plan to admit for xanax withdrawal.  I supervised care provided by the midlevel provider.    We have discussed this patient's history, physical exam, and treatment plan.   I have reviewed the note and personally saw and examined the patient and agree with the plan of care.    Documentation assistance provided by carissa Li for .  Information recorded by the scribe was done at my direction and has been verified and validated by me.         Velvet Li  12/05/17 1520       Darrell Jansen MD  12/05/17 2002       Electronically signed by Darrell Jansen MD at 12/5/2017  8:02 PM      Claribel Matrinez RN at 12/5/2017  3:40 PM          Patient complaining of severe back pain, Dr. Jansen notified and new ordered for PO tylenol given.      Claribel Martinez RN  12/05/17 1542       Electronically signed by Claribel Martinez RN at 12/5/2017  3:42 PM      Jessica Abarca at 12/5/2017  4:18 PM          Patients bed sheets were changed, patient was given another warm blanket     Jessica Abarca  12/05/17 1618       Electronically signed by Jessica Abarca at 12/5/2017  4:18 PM        [employer]    Active LDAs     Name:   Placement date:   Placement time:   Site:   Days:    Peripheral IV Line - Single Lumen 12/05/17 1830 12/05/17 1830      less than 1         Inactive LDAs     None                Hospital Medications (all)       Dose Frequency Start End    acetaminophen (TYLENOL) tablet 1,000 mg 1,000 mg Once 12/5/2017 12/5/2017    Sig - Route: Take 2 tablets by mouth 1 (One) Time. - Oral    acetaminophen (TYLENOL) tablet 650 mg 650 mg Every 6 Hours PRN 12/5/2017     Sig - Route: Take 2 tablets by mouth Every 6 (Six) Hours As Needed for Mild Pain . - Oral    ALPRAZolam (XANAX) tablet 0.25 mg 0.25 mg 3 Times Daily 12/5/2017 12/15/2017    Sig - Route: Take 0.5 tablets by mouth 3 (Three) Times a Day. - Oral    hydrocortisone 1 % ointment  Every 12 Hours Scheduled 12/5/2017     Sig - Route: Apply  topically Every 12 (Twelve) Hours. - Topical    Influenza Vac Subunit Quad (FLUCELVAX) injection 0.5 mL 0.5 mL During Hospitalization 12/5/2017     Sig - Route: Inject 0.5 mL into the shoulder, thigh, or buttocks During Hospitalization for Immunization. - Intramuscular    Cosign for Ordering: Accepted by Brenda Zamora MD on 12/5/2017  6:55 PM    levETIRAcetam in NaCl 0.75% (KEPPRA) IVPB 1,000 mg 1,000 mg Once 12/5/2017 12/5/2017    Sig - Route: Infuse 100 mL into a venous catheter 1 (One) Time. - Intravenous    LORazepam (ATIVAN) injection 1 mg 1 mg Once 12/5/2017 12/5/2017    Sig - Route: Infuse 0.5 mL into a venous catheter 1 (One) Time. - Intravenous    sodium chloride 0.45 % infusion 100 mL/hr Continuous 12/5/2017     Sig - Route: Infuse 100 mL/hr into a venous catheter Continuous. - Intravenous    sodium chloride 0.9 % bolus 1,000 mL 1,000 mL Once 12/5/2017 12/5/2017    Sig - Route: Infuse 1,000 mL into a venous catheter 1 (One) Time. - Intravenous    sodium chloride 0.9 % flush 1-10 mL 1-10 mL As Needed 12/5/2017     Sig - Route: Infuse 1-10 mL into a venous catheter As Needed for  "Line Care. - Intravenous    sodium chloride 0.9 % flush 10 mL 10 mL As Needed 12/5/2017     Sig - Route: Infuse 10 mL into a venous catheter As Needed for Line Care. - Intravenous    Linked Group 1:  \"And\" Linked Group Details                 Consult Notes       Tay ARGUETA Alice at 12/6/2017  9:18 AM  Version 1 of 1     Consult Orders:    1. Inpatient Consult to [facility] [[id number]] ordered by Brenda Zamora MD at 12/05/17 1752                21 y/o single cauc male admitted to the hospital after he has what is thought to be 2 xanax w/ drawal seizures.  Patient states that he uses xanax sporadically.  He first used xanax as at 18 He states he had not used in approx 5 days. He seems to emphasize he had not used them in 5 days.  He was informed that xanax w/ drawal seizures is due to stopping usage.  He reports that on occassions he and friends will get a fifth of alcohol and share the bottle.  He admits to using marijuana 2-3 x's / week.  He snorted cocaine ages [age].  UDS was positive for benzos and THC.  He denies any hx of SI or HI.      Patient saw a counselor one time at work due to anxiety r/t issue w/ housing.  He denies any other MORENO or mental health treatment.  Patient states that he mother has been prescribed xanax in the past and his father would take hers.  He also state that father drinks ETOH.      Patient lives w/ his mother.  He is currently unemployed due to his being fired at [employer] for showing up late.  He states he has an interview w/ [employer] tomorrow.  He has worked there in the past and liked it.  He has a HS education.    Patient is alert and O x 4.  He and his g/f were in bed together w/ 2 other friends in the room.  The friends and g/f left for the assessment.  No SI or HI.  No a/v hallucinations. Speech was appropriate.  He denied any s/s of benzo w/ drawal.  Discussed addition dynamics, health risk and w/ drawal w/ patient.  He is not interested at this time in any [facility] treatment.  [facility] will follow briefly.  He was provided w/ a list of [facility] treatment programs in [facility].      Electronically signed by Tay Jenkins at 12/6/2017  9:30 AM

## 2017-12-06 NOTE — CONSULTS
21 y/o single cauc male admitted to the hospital after he has what is thought to be 2 xanax w/ drawal seizures.  Patient states that he uses xanax sporadically.  He first used xanax as at 18 He states he had not used in approx 5 days. He seems to emphasize he had not used them in 5 days.  He was informed that xanax w/ drawal seizures is due to stopping usage.  He reports that on occassions he and friends will get a fifth of alcohol and share the bottle.  He admits to using marijuana 2-3 x's / week.  He snorted cocaine ages [age].  UDS was positive for benzos and THC.  He denies any hx of SI or HI.      Patient saw a counselor one time at work due to anxiety r/t issue w/ housing.  He denies any other M[facility]ENO or mental health treatment.  Patient states that he mother has been prescribed xanax in the past and his father would take hers.  He also state that father drinks ETOH.      Patient lives w/ his mother.  He is currently unemployed due to his being fired at [employer] for showing up late.  He states he has an interview w/ [employer] tomorrow.  He has worked there in the past and liked it.  He has a HS education.    Patient is alert and O x 4.  He and his g/f were in bed together w/ 2 other friends in the room.  The friends and g/f left for the assessment.  No SI or HI.  No a/v hallucinations. Speech was appropriate.  He denied any s/s of benzo w/ drawal.  Discussed addition dynamics, health risk and w/ drawal w/ patient.  He is not interested at this time in any [facility] treatment.  [facility] will follow briefly.  He was provided w/ a list of [facility] treatment programs in [facility].

## 2017-12-06 NOTE — PLAN OF CARE
Problem: Patient Care Overview (Adult)  Goal: Plan of Care Review  Outcome: Ongoing (interventions implemented as appropriate)    12/06/17 0108   Coping/Psychosocial Response Interventions   Plan Of Care Reviewed With patient   Patient Care Overview   Progress progress toward functional goals as expected   Outcome Evaluation   Outcome Summary/Follow up Plan meds per order, pain meds per request, no seizure activity noted, see labs and vs       Goal: Adult Individualization and Mutuality  Outcome: Ongoing (interventions implemented as appropriate)  Goal: Discharge Needs Assessment  Outcome: Ongoing (interventions implemented as appropriate)    Problem: Seizure Disorder/Epilepsy (Adult)  Goal: Signs and Symptoms of Listed Potential Problems Will be Absent or Manageable (Seizure Disorder/Epilepsy)  Outcome: Ongoing (interventions implemented as appropriate)    Problem: Skin Integrity Impairment, Risk/Actual (Adult)  Goal: Identify Related Risk Factors and Signs and Symptoms  Outcome: Outcome(s) achieved Date Met:  12/06/17  Goal: Skin Integrity/Wound Healing  Outcome: Ongoing (interventions implemented as appropriate)    Problem: Pain, Acute (Adult)  Goal: Identify Related Risk Factors and Signs and Symptoms  Outcome: Outcome(s) achieved Date Met:  12/06/17  Goal: Acceptable Pain Control/Comfort Level  Outcome: Ongoing (interventions implemented as appropriate)

## 2017-12-06 NOTE — H&P
[facility]    Patient Identification:  Name: Adama Mckeon  Age/Sex: 22 y.o. male  :  1995  MRN: [id number]         Primary Care Physician: No Known Provider    Chief Complaint   Patient presents with   • Seizures     WITNESSED SZ LASTING APPROX. 2MIN, NO KNOWN HISTORY; PT NOW REPORTS FEELING DIZZY         History of Present Illness  Patient is a 22 y.o. male who presents with a witnessed seizure at home earlier today.  The patient's girlfriend said he was sitting on the bed, stared blankly, fell back and then began with tonic-clonic movements of his arms and legs and increased salivation.  He was disoriented and not talking after the event for about 20 minutes.  EMS brought him to the hospital.  In the emergency room he had another witnessed seizure that lasted for approximately a minute.  It was similar to before but this time he had some trauma to his tongue.  There was a postictal period of about 5 minutes.  Currently he is awake and not complaining of any complaints other than he wants to go home.  He denies any previous history of any seizure disorder.  He has been taking some 2 mg Xanax bars for the last couple of months. He is not willing to say how many per day.  He stopped taking these between 3 days to a week depending upon who elicits information from him.  He denies the use of intravenous drugs, opiates, skin popping, cocaine or methamphetamine.  He denies regular alcohol use but does smoke marijuana occasionally.  He stopped trying to use the Xanax because friends were getting arrested.    He has a history of some eczema.  He has numerous circular areas on his hands and arms face and legs that are erythematous with some scaling without secondary infection.  Some of these are pruritic others or not.  He denies using any topical agent for these.    History reviewed. No pertinent past medical history.  Past Surgical History:   Procedure  Laterality Date   • TONSILLECTOMY     • WRIST SURGERY       History reviewed. No pertinent family history.  Social History   Substance Use Topics   • Smoking status: Former Smoker   • Smokeless tobacco: Never Used   • Alcohol use Yes      Comment: occ     Prescriptions Prior to Admission   Medication Sig Dispense Refill Last Dose   • ibuprofen (ADVIL,MOTRIN) 400 MG tablet Take 400 mg by mouth        Allergies:  Review of patient's allergies indicates no known allergies.    Review Of Systems:  CONSTITUTIONAL: Denies fever or weight loss or gain  H EENT: Denies visual or auditory changes  ENDOCRINE: Denies symptoms suggestive of thyroid disease or diabetes  PULMONARY: Denies cough, dyspnea, wheezing.  Denies asthma  CARDIAC: Denies palpitations, chest pain, valvular disease, or rhythm disturbance  GI: Denies dysphasia, change in bowel pattern, abdominal pain, fatty food intolerance.    : Denies dysuria, frequency, urgency, hematuria.  MUSCULOSKELETAL: Denies gout   HEM/ONC: Denies anemia, bleeding or DVT  The rest of the review of systems are listed in the history of present illness.    Vital Signs  Temp:  [96.7 °F (35.9 °C)-98.5 °F (36.9 °C)] 98.5 °F (36.9 °C)  Heart Rate:  [71-97] 97  Resp:  [18] 18  BP: (120-185)/(53-79) 129/72  Body mass index is 20.54 kg/(m^2).    .GENERAL: The patient is a  white male who appears his stated age.  He makes good eye contact but does answer questions cautiously  HEENT: PERRLA, pupils are 4 mm in size, EOMI, there are 2 areas of buccal mucosa trauma that or a couple millimeters in size with erythema and small amount of ecchymoses.  The right side of his tongue has an abrasion in a V shape 3 mm one side 4 mm on other side.  There are multiple dry scaly areas over his forehead and cheeks that looked like they've and chronically picked but without secondary infection  NECK: Thyroid is not enlarged, ROM normal, no lymphadenopathy  CHEST: Normal shape and expansion, no retractions or  tenderness  LUNGS: Clear  HEART:Regular rate and rhythm, no murmur   ABDOMEN: Soft, nondistended, and nontender with normal bowel sounds.  No masses or organomegaly  EXTREMITIES: No edema or cyanosis, good range of motion of elbows, hips, knees.  There are multiple circular areas over his hands abdomen and legs with scaling without secondary infection.  NEUROLOGIC: He is alert and  oriented ×3, muscle strength in upper and lower extremities is equal in all areas  RECTAL/SCROTAL: Not performed  BACK: Normal    Results Review:    I reviewed the patient's new clinical results.      Results from last 7 days  Lab Units 12/05/17  1415   WBC 10*3/mm3 14.23*   HEMOGLOBIN g/dL 15.9   PLATELETS 10*3/mm3 214       Results from last 7 days  Lab Units 12/05/17  1415   SODIUM mmol/L 140   POTASSIUM mmol/L 3.8   CHLORIDE mmol/L 101   CO2 mmol/L 26.3   BUN mg/dL 10   CREATININE mg/dL 0.79   CALCIUM mg/dL 9.5   GLUCOSE mg/dL 98         Hemoglobin A1C:No results found for: HGBA1C    Glucose Range:No results found for: POCGLU    Assessment/Plan     Principal Problem:    New onset seizure  Active Problems:    Eczema    Benzodiazepine withdrawal with complication    Benzodiazepine abuse    Leukocytosis      Assessment & Plan  New-onset seizure secondary to benzodiazepine withdrawal: Oral Xanax to be given 3 times a day at a low dose with plans for titration to prevent seizures. He  does not need anticonvulsive agent    Eczema: Start topical hydrocortisone    Leukocytosis: secondary to current event recheck in a.m.    Benzodiazepine abuse: To be seen by access      I discussed the patients findings and my recommendations with patient.          Brenda Zamora MD  [facility]  12/05/17  8:05 PM

## 2017-12-06 NOTE — PROGRESS NOTES
[facility]     LOS: 1 day     Name: Adama Mckeon  Age: 22 y.o.  Sex: male  :  1995  MRN: [id number]         Primary Care Physician: No Known Provider    Diet Regular    Subjective   No complaints.    Objective   Temp:  [96.7 °F (35.9 °C)-98.7 °F (37.1 °C)] 98.7 °F (37.1 °C)  Heart Rate:  [53-97] 72  Resp:  [17-18] 18  BP: (107-185)/(53-79) 123/76  SpO2:  [91 %-99 %] 96 %  on   ;   O2 Device: room air  Body mass index is 20.54 kg/(m^2).    Physical Exam   Constitutional: He is oriented to person, place, and time. No distress.   Cardiovascular: Normal rate and regular rhythm.    Pulmonary/Chest: Effort normal and breath sounds normal. No respiratory distress.   Abdominal: Soft. There is no tenderness.   Neurological: He is alert and oriented to person, place, and time.   Skin: Skin is warm and dry.     Reviewed medications and new clinical results    ALPRAZolam 0.25 mg Oral TID   hydrocortisone  Topical Q12H     sodium chloride 100 mL/hr Last Rate: 100 mL/hr (17 0331)     Results from last 7 days  Lab Units 17  0528 17  1415   WBC 10*3/mm3 12.33* 14.23*   HEMOGLOBIN g/dL 14.9 15.9   PLATELETS 10*3/mm3 194 214     Results from last 7 days  Lab Units 17  0528 17  1415   SODIUM mmol/L 141 140   POTASSIUM mmol/L 3.7 3.8   CHLORIDE mmol/L 105 101   CO2 mmol/L 26.6 26.3   BUN mg/dL 8 10   CREATININE mg/dL 0.74* 0.79   CALCIUM mg/dL 8.5* 9.5   GLUCOSE mg/dL 96 98     Lab Results   Component Value Date    ANIONGAP 9.4 2017     Estimated Creatinine Clearance: 163.5 mL/min (by C-G formula based on Cr of 0.74).    Assessment/Plan   Active Hospital Problems (** Indicates Principal Problem)    Diagnosis Date Noted   • **New onset seizure [R56.9] 2017   • Eczema [L30.9] 2017   • Benzodiazepine withdrawal with complication [F13.239] 2017   • Benzodiazepine abuse [F13.10] 2017   • Leukocytosis [D72.829]  12/05/2017      Resolved Hospital Problems    Diagnosis Date Noted Date Resolved   No resolved problems to display.     · Probably benzo withdrawal. He is adamant that he only takes Xanax recreationally (weekly) and can go a week without problem. Last use 2-3 days ago. Will check MRI, ask neurology to see.  · On scheduled xanax at the moment.  · Leukocytosis reactive, recheck.    Aric Bunch MD   12/06/17  11:13 AM

## 2017-12-07 VITALS
OXYGEN SATURATION: 91 % | RESPIRATION RATE: 18 BRPM | WEIGHT: 160 LBS | SYSTOLIC BLOOD PRESSURE: 135 MMHG | HEIGHT: 74 IN | BODY MASS INDEX: 20.53 KG/M2 | HEART RATE: 91 BPM | DIASTOLIC BLOOD PRESSURE: 75 MMHG | TEMPERATURE: 98 F

## 2017-12-07 LAB
DEPRECATED RDW RBC AUTO: 41 FL (ref 37–54)
ERYTHROCYTE [DISTWIDTH] IN BLOOD BY AUTOMATED COUNT: 12.4 % (ref 11.5–14.5)
HCT VFR BLD AUTO: 45.1 % (ref 40.4–52.2)
HGB BLD-MCNC: 15.3 G/DL (ref 13.7–17.6)
MCH RBC QN AUTO: 30.8 PG (ref 27–32.7)
MCHC RBC AUTO-ENTMCNC: 33.9 G/DL (ref 32.6–36.4)
MCV RBC AUTO: 90.9 FL (ref 79.8–96.2)
PLATELET # BLD AUTO: 191 10*3/MM3 (ref 140–500)
PMV BLD AUTO: 10.2 FL (ref 6–12)
RBC # BLD AUTO: 4.96 10*6/MM3 (ref 4.6–6)
WBC NRBC COR # BLD: 9.2 10*3/MM3 (ref 4.5–10.7)

## 2017-12-07 PROCEDURE — 85027 COMPLETE CBC AUTOMATED: CPT | Performed by: HOSPITALIST

## 2017-12-07 RX ORDER — ALPRAZOLAM 0.25 MG/1
0.25 TABLET ORAL 2 TIMES DAILY
Qty: 10 TABLET | Refills: 0 | Status: SHIPPED | OUTPATIENT
Start: 2017-12-07

## 2017-12-07 RX ORDER — DIAPER,BRIEF,INFANT-TODD,DISP
EACH MISCELLANEOUS EVERY 12 HOURS SCHEDULED
Start: 2017-12-07

## 2017-12-07 RX ADMIN — HYDROCORTISONE: 1 OINTMENT TOPICAL at 09:08

## 2017-12-07 RX ADMIN — ALPRAZOLAM 0.25 MG: 0.5 TABLET ORAL at 09:08

## 2017-12-07 NOTE — PLAN OF CARE
Problem: Patient Care Overview (Adult)  Goal: Plan of Care Review  Outcome: Ongoing (interventions implemented as appropriate)    12/07/17 0203   Coping/Psychosocial Response Interventions   Plan Of Care Reviewed With patient   Patient Care Overview   Progress progress toward functional goals as expected   Outcome Evaluation   Outcome Summary/Follow up Plan meds per order, seizure precautions maintained, no co pain, skin care per order, see labs and vs       Goal: Adult Individualization and Mutuality  Outcome: Ongoing (interventions implemented as appropriate)  Goal: Discharge Needs Assessment  Outcome: Ongoing (interventions implemented as appropriate)    Problem: Seizure Disorder/Epilepsy (Adult)  Goal: Signs and Symptoms of Listed Potential Problems Will be Absent or Manageable (Seizure Disorder/Epilepsy)  Outcome: Ongoing (interventions implemented as appropriate)    Problem: Skin Integrity Impairment, Risk/Actual (Adult)  Goal: Skin Integrity/Wound Healing  Outcome: Ongoing (interventions implemented as appropriate)    Problem: Pain, Acute (Adult)  Goal: Acceptable Pain Control/Comfort Level  Outcome: Ongoing (interventions implemented as appropriate)

## 2017-12-07 NOTE — PROGRESS NOTES
Case Management Discharge Note    Final Note: Home   CCP met w/ patient in room.  Introduced self and explained reason for visit; no PCP and needs to be seen for benodiazipine  withdrawal.  Patient is not current w/facility for treatment.        Appt made with [facility] for next Tuesday 12/12 @ 0800.  Pt was advised and informed to not be late for  the appt and to bring ID.      Discharge Placement     No information found        Other: Other    Discharge Codes: 01  Discharge to home

## 2017-12-07 NOTE — PAYOR COMM NOTE
"UR CONTACT:  LAWRENCE             P:  [phone]  F:  [phone]        Adama Mckeon (22 y.o. Male)     Date of Birth Social Security Number Address Home Phone MRN    1995  [address]  [place] [address] [phone] [id number]    Church Marital Status          None Single       Admission Date Admission Type Admitting Provider Attending Provider Department, Room/Bed    17 Emergency Brenda Zamora MD  [facility] [address] [place], P591/1    Discharge Date Discharge Disposition Discharge Destination        2017 Home or Self Care             Attending Provider: (none)    Allergies:  No Known Allergies    Isolation:  None   Infection:  None   Code Status:  FULL    Ht:  188 cm (74\")   Wt:  72.6 kg (160 lb)    Admission Cmt:  None   Principal Problem:  New onset seizure [R56.9]                 Active Insurance as of 2017     Primary Coverage     Payor Plan Insurance Group Employer/Plan Group    PAS[employer]ORT PAS[employer]ORT MEDICAID     Payor Plan Address Payor Plan Phone Number Effective From Effective To    [address] [phone] [address]     [place] [address]       Subscriber Name Subscriber Birth Date Member ADAMA ARTIS 1995 [id number]                 Emergency Contacts      (Rel.) Home Phone Work Phone Mobile Phone    Radhika Mckeon (Mother) [phone] -- [phone]               Physician Progress Notes      Aric Bucnh MD at 2017 11:13 AM  Version 1 of 1                             [facility]     LOS: 1 day     Name: Adama Mckeon  Age: 22 y.o.  Sex: male  :  1995  MRN: [id number]         Primary Care Physician: No Known Provider    Diet Regular    Subjective   No complaints.    Objective   Temp:  [96.7 °F (35.9 °C)-98.7 °F (37.1 °C)] 98.7 °F (37.1 °C)  Heart Rate:  [53-97] 72  Resp:  [17-18] 18  BP: (107-185)/(53-79) 123/76  SpO2:  [91 %-99 %] 96 %  on   ;   O2 Device: room " air  Body mass index is 20.54 kg/(m^2).    Physical Exam   Constitutional: He is oriented to person, place, and time. No distress.   Cardiovascular: Normal rate and regular rhythm.    Pulmonary/Chest: Effort normal and breath sounds normal. No respiratory distress.   Abdominal: Soft. There is no tenderness.   Neurological: He is alert and oriented to person, place, and time.   Skin: Skin is warm and dry.     Reviewed medications and new clinical results    ALPRAZolam 0.25 mg Oral TID   hydrocortisone  Topical Q12H     sodium chloride 100 mL/hr Last Rate: 100 mL/hr (12/06/17 0331)     Results from last 7 days  Lab Units 12/06/17  0528 12/05/17  1415   WBC 10*3/mm3 12.33* 14.23*   HEMOGLOBIN g/dL 14.9 15.9   PLATELETS 10*3/mm3 194 214     Results from last 7 days  Lab Units 12/06/17  0528 12/05/17  1415   SODIUM mmol/L 141 140   POTASSIUM mmol/L 3.7 3.8   CHLORIDE mmol/L 105 101   CO2 mmol/L 26.6 26.3   BUN mg/dL 8 10   CREATININE mg/dL 0.74* 0.79   CALCIUM mg/dL 8.5* 9.5   GLUCOSE mg/dL 96 98     Lab Results   Component Value Date    ANIONGAP 9.4 12/06/2017     Estimated Creatinine Clearance: 163.5 mL/min (by C-G formula based on Cr of 0.74).    Assessment/Plan   Active Hospital Problems (** Indicates Principal Problem)    Diagnosis Date Noted   • **New onset seizure [R56.9] 12/05/2017   • Eczema [L30.9] 12/05/2017   • Benzodiazepine withdrawal with complication [F13.239] 12/05/2017   • Benzodiazepine abuse [F13.10] 12/05/2017   • Leukocytosis [D72.829] 12/05/2017      Resolved Hospital Problems    Diagnosis Date Noted Date Resolved   No resolved problems to display.     · Probably benzo withdrawal. He is adamant that he only takes Xanax recreationally (weekly) and can go a week without problem. Last use 2-3 days ago. Will check MRI, ask neurology to see.  · On scheduled xanax at the moment.  · Leukocytosis reactive, recheck.    Aric Bunch MD   12/06/17  11:13 AM       Electronically signed by Aric Bunch,  MD at 12/6/2017 11:24 AM           Consult Notes       Tay Jenkins at 12/6/2017  9:18 AM  Version 1 of 1     Consult Orders:    1. Inpatient Consult to [facility] [[id number]] ordered by Brenda Zamora MD at 12/05/17 1752                21 y/o single cauc male admitted to the hospital after he has what is thought to be 2 xanax w/ drawal seizures.  Patient states that he uses xanax sporadically.  He first used xanax as at 18 He states he had not used in approx 5 days. He seems to emphasize he had not used them in 5 days.  He was informed that xanax w/ drawal seizures is due to stopping usage.  He reports that on occassions he and friends will get a fifth of alcohol and share the bottle.  He admits to using marijuana 2-3 x's / week.  He snorted cocaine ages [age].  UDS was positive for benzos and THC.  He denies any hx of SI or HI.      Patient saw a counselor one time at work due to anxiety r/t issue w/ housing.  He denies any other MORENO or mental health treatment.  Patient states that he mother has been prescribed xanax in the past and his father would take hers.  He also state that father drinks ETOH.      Patient lives w/ his mother.  He is currently unemployed due to his being fired at [employer] for showing up late.  He states he has an interview w/ [employer] tomorrow.  He has worked there in the past and liked it.  He has a HS education.    Patient is alert and O x 4.  He and his g/f were in bed together w/ 2 other friends in the room.  The friends and g/f left for the assessment.  No SI or HI.  No a/v hallucinations. Speech was appropriate.  He denied any s/s of benzo w/ drawal.  Discussed addition dynamics, health risk and w/ drawal w/ patient.  He is not interested at this time in any MORENO treatment.  [facility] will follow briefly.  He was provided w/ a list of MORENO treatment programs in [facility].      Electronically signed by Tay Jenkins at 12/6/2017  9:30 AM      Nacho Faust MD at 12/6/2017   2:14 PM  Version 1 of 1     Consult Orders:    1. Inpatient Consult to Neurology [[id number]] ordered by Aric Bunch MD at 17 1120                  Patient Identification:  NAME:  Adama Mckeon  Age:  22 y.o.   Sex:  male   :  1995   MRN:  [id number]       Chief complaint: He doesn't have one /reason for consult seizure    History of present illness:  This patient is a 22-year-old right-handed white male who takes a lot of illicit drugs including benzodiazepines he recently presents with a seizure and he thinks it's due to the alprazolam that he is been taking and he is right he is had a CT that by my independent eyeball review is normal he did bite his tongue duration of the seizures unknown quality tonic-clonic context the patient who's withdrawing from benzodiazepines and is a known drug addict.  Modifying factors some benzodiazepines only.  He is had a CT that is normal and is going for an MRI scan currently.      Past medical history:  Past Medical History:   Diagnosis Date   • Substance abuse        Past surgical history:  Past Surgical History:   Procedure Laterality Date   • TONSILLECTOMY     • WRIST SURGERY         Allergies:  Review of patient's allergies indicates no known allergies.    Home medications:  Prescriptions Prior to Admission   Medication Sig Dispense Refill Last Dose   • ibuprofen (ADVIL,MOTRIN) 400 MG tablet Take 400 mg by mouth           Hospital medications:    ALPRAZolam 0.25 mg Oral TID   hydrocortisone  Topical Q12H       sodium chloride 75 mL/hr Last Rate: 75 mL/hr (17 1156)     •  acetaminophen  •  influenza vaccine  •  sodium chloride  •  Insert peripheral IV **AND** sodium chloride    Family history:  Family History   Problem Relation Age of Onset   • Anxiety disorder Mother    • Drug abuse Mother    • Alcohol abuse Father    • Drug abuse Father        Social history:  Social History   Substance Use Topics   • Smoking status: Former Smoker   • Smokeless tobacco:  Never Used      Comment: denied ever smoking to this clinician when ask   • Alcohol use Yes      Comment: occ         Review of systems:    No hair eyes ears nose throat skin bone joint  lymphatic hematologic or oncologic complaints no neck pain chest pain abdominal pain bowel bladder incontinence fever chills or rash    Objective:  Vitals Ranges:   Temp:  [98 °F (36.7 °C)-98.7 °F (37.1 °C)] 98.7 °F (37.1 °C)  Heart Rate:  [53-97] 72  Resp:  [17-18] 18  BP: (107-185)/(53-79) 123/76      Physical Exam:  Awake alert well oriented fund of knowledge good attention span and concentration good recent and remote memory good language function normal well-developed well-nourished in no distress pupils to constricting to 1 normal disc retinas visual fields extraocular movements full without nystagmus nasolabial folds palate tongue symmetrical normal hearing facial sensation head turning shoulder shrug motor somewhat tremulous good motor times for 70s no atrophy fasciculations rigidity or bradykinesia reflexes trace throughout symmetrical toes downgoing bilaterally sensation normal light touch face both arms and both legs coordination normal in the upper and lower extremities he's able to ambulate without ataxia heart regular without murmur neck supple without bruits extremities no clubbing cyanosis or edema visual acuity normal at 3 feet awake alert and oriented ×3    Results review:   I reviewed the patient's new clinical results.    Data review:  Lab Results (last 24 hours)     Procedure Component Value Units Date/Time    CBC & Differential [[id number]] Collected:  12/05/17 1415    Specimen:  Blood Updated:  12/05/17 1425    Narrative:       The following orders were created for panel order CBC & Differential.  Procedure                               Abnormality         Status                     ---------                               -----------         ------                     CBC Auto Differential[[id number]]         Abnormal            Final result                 Please view results for these tests on the individual orders.    CBC Auto Differential [[id number]]  (Abnormal) Collected:  12/05/17 1415    Specimen:  Blood Updated:  12/05/17 1425     WBC 14.23 (H) 10*3/mm3      RBC 5.16 10*6/mm3      Hemoglobin 15.9 g/dL      Hematocrit 45.9 %      MCV 89.0 fL      MCH 30.8 pg      MCHC 34.6 g/dL      RDW 12.2 %      RDW-SD 39.2 fl      MPV 10.0 fL      Platelets 214 10*3/mm3      Neutrophil % 84.2 (H) %      Lymphocyte % 7.9 (L) %      Monocyte % 7.5 %      Eosinophil % 0.1 (L) %      Basophil % 0.1 %      Immature Grans % 0.2 %      Neutrophils, Absolute 11.96 (H) 10*3/mm3      Lymphocytes, Absolute 1.13 10*3/mm3      Monocytes, Absolute 1.07 10*3/mm3      Eosinophils, Absolute 0.02 10*3/mm3      Basophils, Absolute 0.02 10*3/mm3      Immature Grans, Absolute 0.03 10*3/mm3     Urinalysis With / Culture If Indicated - Urine, Clean Catch [[id number]]  (Abnormal) Collected:  12/05/17 1418    Specimen:  Urine from Urine, Clean Catch Updated:  12/05/17 1434     Color, UA Yellow     Appearance, UA Clear     pH, UA 8.5 (H)     Specific Gravity, UA 1.009     Glucose, UA Negative     Ketones, UA Negative     Bilirubin, UA Negative     Blood, UA Negative     Protein, UA Negative     Leuk Esterase, UA Negative     Nitrite, UA Negative     Urobilinogen, UA 0.2 E.U./dL    Narrative:       Urine microscopic not indicated.    Comprehensive Metabolic Panel [[id number]] Collected:  12/05/17 1415    Specimen:  Blood Updated:  12/05/17 1447     Glucose 98 mg/dL      BUN 10 mg/dL      Creatinine 0.79 mg/dL      Sodium 140 mmol/L      Potassium 3.8 mmol/L      Chloride 101 mmol/L      CO2 26.3 mmol/L      Calcium 9.5 mg/dL      Total Protein 7.5 g/dL      Albumin 4.60 g/dL      ALT (SGPT) 11 U/L      AST (SGOT) 22 U/L      Alkaline Phosphatase 74 U/L      Total Bilirubin 0.5 mg/dL      eGFR Non African Amer 125 mL/min/1.73      Globulin 2.9 gm/dL       A/G Ratio 1.6 g/dL      BUN/Creatinine Ratio 12.7     Anion Gap 12.7 mmol/L     Ethanol [[id number]] Collected:  12/05/17 1415    Specimen:  Blood Updated:  12/05/17 1447     Ethanol <10 mg/dL      Ethanol % <0.010 %     Urine Drug Screen - Urine, Clean Catch [[id number]]  (Abnormal) Collected:  12/05/17 1418    Specimen:  Urine from Urine, Clean Catch Updated:  12/05/17 1453     Amphet/Methamphet, Screen Negative     Barbiturates Screen, Urine Negative     Benzodiazepine Screen, Urine Positive (A)     Cocaine Screen, Urine Negative     Opiate Screen Negative     THC, Screen, Urine Positive (A)     Methadone Screen, Urine Negative     Oxycodone Screen, Urine Negative    Narrative:       Negative Thresholds For Drugs Screened:     Amphetamines               500 ng/ml   Barbiturates               200 ng/ml   Benzodiazepines            100 ng/ml   Cocaine                    300 ng/ml   Methadone                  300 ng/ml   Opiates                    300 ng/ml   Oxycodone                  100 ng/ml   THC                        50 ng/ml    The Normal Value for all drugs tested is negative. This report includes final unconfirmed screening results to be used for medical treatment purposes only. Unconfirmed results must not be used for non-medical purposes such as employment or legal testing. Clinical consideration should be applied to any drug of abuse test, particulary when unconfirmed results are used.    CBC (No Diff) [[id number]]  (Abnormal) Collected:  12/06/17 0528    Specimen:  Blood Updated:  12/06/17 0618     WBC 12.33 (H) 10*3/mm3      RBC 4.90 10*6/mm3      Hemoglobin 14.9 g/dL      Hematocrit 44.6 %      MCV 91.0 fL      MCH 30.4 pg      MCHC 33.4 g/dL      RDW 12.3 %      RDW-SD 41.0 fl      MPV 10.4 fL      Platelets 194 10*3/mm3     Basic Metabolic Panel [[id number]]  (Abnormal) Collected:  12/06/17 0528    Specimen:  Blood Updated:  12/06/17 0638     Glucose 96 mg/dL      BUN 8 mg/dL      Creatinine 0.74 (L)  mg/dL      Sodium 141 mmol/L      Potassium 3.7 mmol/L      Chloride 105 mmol/L      CO2 26.6 mmol/L      Calcium 8.5 (L) mg/dL      eGFR Non African Amer 135 mL/min/1.73      BUN/Creatinine Ratio 10.8     Anion Gap 9.4 mmol/L     Narrative:       GFR Normal >60  Chronic Kidney Disease <60  Kidney Failure <15           Imaging:  Imaging Results (last 24 hours)     Procedure Component Value Units Date/Time    CT Head Without Contrast [[id number]] Collected:  12/05/17 1411     Updated:  12/06/17 0917    Narrative:       CT HEAD WITHOUT CONTRAST     HISTORY: Syncope, seizure. Hit back of head.     TECHNIQUE: A noncontrasted CT examination of the brain was performed.     FINDINGS:  The brain and ventricles are symmetrical. There is no  evidence of fracture or of intracranial hemorrhage. No focal area of  decreased attenuation to suggest acute infarction or contusion is  appreciated. There is no evidence of hydrocephalus, midline shift or of  abnormal extra-axial fluid.       Impression:       No acute process identified. Further evaluation with a MRI  examination of the brain is recommended, particularly if the patient has  a history of new onset seizure activity.     The above information was called to Dr. Jansen.           Radiation dose reduction techniques were utilized, including automated  exposure control and exposure modulation based on body size.     This report was finalized on 12/6/2017 9:14 AM by Dr. Thom Reyes MD.       MRI Brain With & Without Contrast [[id number]] Collected:  12/06/17 1352     Updated:  12/06/17 1352    Narrative:       MRI OF THE BRAIN WITH AND WITHOUT CONTRAST     CLINICAL HISTORY: Patient has had 2 seizures, but no history of  epilepsy.     TECHNIQUE: MRI of the brain was obtained with sagittal T1, axial pre and  postgadolinium T1, coronal postgadolinium T1, axial FLAIR, axial T2,  axial diffusion, axial gradient echo, and axial 3D SPGR images.  Additionally, there are thin cut  coronal T2-weighted images through the  mesial temporal structures.     COMPARISON: Comparison is made to prior CT scan head dated 12/05/2017.     FINDINGS:     The study is somewhat compromised by motion artifact. The midline  intracranial anatomy is unremarkable. There are no abnormal areas of  contrast enhancement.       Impression:          No evidence for acute intrapelvic pathology. Also, no potential seizure  focus is identified. The ventricles, sulci, and cisterns are age  appropriate. The major intracranial flow related signal voids are within  normal limits. The mesial temporal structures are symmetric in signal  intensity and volume. There are no abnormal areas of susceptibility  artifact within the brain parenchyma. There are no abnormal areas of  restricted diffusion. The midline intracranial anatomy is within normal  limits. No potential congenital seizure foci are visualized.                Assessment and Plan:     Principal Problem:    New onset seizure  Active Problems:    Eczema    Benzodiazepine withdrawal with complication    Benzodiazepine abuse    Leukocytosis  This is a classic benzodiazepine withdrawal seizure in a patient who abuses drugs of all varieties.  At this point he is CT is negative their checking an MRI scan but I would see no indication to give this patient seizure medication or to provide further workup.  There is no evidence of stroke TIA or primary seizure disorder given this history and presentation.  He will not drive until given clearance by an outpatient M.D.      Nacho Faust MD  12/06/17  2:14 PM     Electronically signed by Nacho Faust MD at 12/6/2017  2:17 PM           Discharge Summary      Nupur Frost MD at 12/7/2017 11:45 AM          Date of Admission: 12/5/2017  Date of Discharge:  12/7/2017  Primary Care Physician: No Known Provider     Discharge Diagnosis:  Active Hospital Problems (** Indicates Principal Problem)    Diagnosis Date Noted   • **New onset  seizure [R56.9] 12/05/2017   • Eczema [L30.9] 12/05/2017   • Benzodiazepine withdrawal with complication [F13.239] 12/05/2017   • Benzodiazepine abuse [F13.10] 12/05/2017   • Leukocytosis [D72.829] 12/05/2017      Resolved Hospital Problems    Diagnosis Date Noted Date Resolved   No resolved problems to display.       Presenting Problem/History of Present Illness:  New onset seizure [R56.9]  Benzodiazepine withdrawal without complication [F13.230]     Hospital Course:  The patient is a 22 y.o. admitted for a seizure.  This was secondary to benzodiazepine withdrawal.  He was seen by neurology.  MRI of the brain was done and was normal.  He has had no further episodes.  He was on Xanax 3 times a day here.  I will decrease to twice daily.  He will need to have this tapered by 0.25 mg every week.  I've told him he may not drive for 3 months by [place] law.  [facility] has arranged a follow-up appointment for him on 12/12/17.  He will receive a prescription for #10 Xanax 0.25 mg tablets.    Stable condition; good prognosis if he remains sober    Exam Today:  Feels okay.  Ready to go home.  Vital signs noted. Thin.  Heart is regular without murmur.  Lungs are clear.  Abdomen nondistended.  Extremities no edema    Procedures Performed:  MRI of the brain with and without contrast 12/6/17.  Negative for pathology or seizure focus  CT head without contrast 12/5/17, negative     Labs  White count 9.2 hemoglobin 15.3 platelets 191,000  Glucose 96 BUN 8 creatinine 0.7 sodium 141 potassium 3.7 chloride 105 bicarbonate 27  Tox screen positive for benzos and marijuana    Consults:   Dr. Nacho Faust     Discharge Disposition:  Home or Self Care    Discharge Medications:   Adama Mckeon   Home Medication Instructions BETTY:[id number]    Printed on:12/07/17 1152   Medication Information                      ALPRAZolam (XANAX) 0.25 MG tablet  Take 1 tablet by mouth 2 (Two) Times a Day.             hydrocortisone 1 % ointment  Apply  topically  Every 12 (Twelve) Hours.             ibuprofen (ADVIL,MOTRIN) 400 MG tablet  Take 400 mg by mouth                 Discharge Diet:   Diet Instructions     Diet: Regular; Thin       Discharge Diet:  Regular   Fluid Consistency:  Thin                 Activity at Discharge:   Activity Instructions     Activity as Tolerated           Other Instructions (Specify)       No driving for 3 months                 Follow-up Appointments:  12/12/17 at [facility]      Test Results Pending at Discharge: None       Nupur Frost MD  12/07/17  11:52 AM    Time Spent on Discharge Activities: 35 minutes             Electronically signed by Nupur Frost MD at 12/7/2017 11:54 AM

## 2017-12-07 NOTE — DISCHARGE INSTR - APPOINTMENTS
[facility]?  [address] [id number] [phone]    12/12 @ 0800 (MUST BE ON TIME)    Bring ID and insurance card.

## 2017-12-07 NOTE — DISCHARGE SUMMARY
Date of Admission: 12/5/2017  Date of Discharge:  12/7/2017  Primary Care Physician: No Known Provider     Discharge Diagnosis:  Active Hospital Problems (** Indicates Principal Problem)    Diagnosis Date Noted   • **New onset seizure [R56.9] 12/05/2017   • Eczema [L30.9] 12/05/2017   • Benzodiazepine withdrawal with complication [F13.239] 12/05/2017   • Benzodiazepine abuse [F13.10] 12/05/2017   • Leukocytosis [D72.829] 12/05/2017      Resolved Hospital Problems    Diagnosis Date Noted Date Resolved   No resolved problems to display.       Presenting Problem/History of Present Illness:  New onset seizure [R56.9]  Benzodiazepine withdrawal without complication [F13.230]     Hospital Course:  The patient is a 22 y.o. admitted for a seizure.  This was secondary to benzodiazepine withdrawal.  He was seen by neurology.  MRI of the brain was done and was normal.  He has had no further episodes.  He was on Xanax 3 times a day here.  I will decrease to twice daily.  He will need to have this tapered by 0.25 mg every week.  I've told him he may not drive for 3 months by [place] law.  [facility] has arranged a follow-up appointment for him on 12/12/17.  He will receive a prescription for #10 Xanax 0.25 mg tablets.    Stable condition; good prognosis if he remains sober    Exam Today:  Feels okay.  Ready to go home.  Vital signs noted. Thin.  Heart is regular without murmur.  Lungs are clear.  Abdomen nondistended.  Extremities no edema    Procedures Performed:  MRI of the brain with and without contrast 12/6/17.  Negative for pathology or seizure focus  CT head without contrast 12/5/17, negative     Labs  White count 9.2 hemoglobin 15.3 platelets 191,000  Glucose 96 BUN 8 creatinine 0.7 sodium 141 potassium 3.7 chloride 105 bicarbonate 27  Tox screen positive for benzos and marijuana    Consults:   Dr. Nacho Faust     Discharge Disposition:  Home or Self Care    Discharge Medications:   Adama Mckeon   Home Medication Instructions  [facility]:[id number]    Printed on:12/07/17 1732   Medication Information                      ALPRAZolam (XANAX) 0.25 MG tablet  Take 1 tablet by mouth 2 (Two) Times a Day.             hydrocortisone 1 % ointment  Apply  topically Every 12 (Twelve) Hours.             ibuprofen (ADVIL,MOTRIN) 400 MG tablet  Take 400 mg by mouth                 Discharge Diet:   Diet Instructions     Diet: Regular; Thin       Discharge Diet:  Regular   Fluid Consistency:  Thin                 Activity at Discharge:   Activity Instructions     Activity as Tolerated           Other Instructions (Specify)       No driving for 3 months                 Follow-up Appointments:  12/12/17 at [facility]      Test Results Pending at Discharge: None       Nupur Frost MD  12/07/17  11:52 AM    Time Spent on Discharge Activities: 35 minutes

## 2021-04-22 NOTE — CONSULTS
Patient Identification:  NAME:  Adama Mckeon  Age:  22 y.o.   Sex:  male   :  1995   MRN:  [id number]       Chief complaint: He doesn't have one /reason for consult seizure    History of present illness:  This patient is a 22-year-old right-handed white male who takes a lot of illicit drugs including benzodiazepines he recently presents with a seizure and he thinks it's due to the alprazolam that he is been taking and he is right he is had a CT that by my independent eyeball review is normal he did bite his tongue duration of the seizures unknown quality tonic-clonic context the patient who's withdrawing from benzodiazepines and is a known drug addict.  Modifying factors some benzodiazepines only.  He is had a CT that is normal and is going for an MRI scan currently.      Past medical history:  Past Medical History:   Diagnosis Date   • Substance abuse        Past surgical history:  Past Surgical History:   Procedure Laterality Date   • TONSILLECTOMY     • WRIST SURGERY         Allergies:  Review of patient's allergies indicates no known allergies.    Home medications:  Prescriptions Prior to Admission   Medication Sig Dispense Refill Last Dose   • ibuprofen (ADVIL,MOTRIN) 400 MG tablet Take 400 mg by mouth           Hospital medications:    ALPRAZolam 0.25 mg Oral TID   hydrocortisone  Topical Q12H       sodium chloride 75 mL/hr Last Rate: 75 mL/hr (17 1156)     •  acetaminophen  •  influenza vaccine  •  sodium chloride  •  Insert peripheral IV **AND** sodium chloride    Family history:  Family History   Problem Relation Age of Onset   • Anxiety disorder Mother    • Drug abuse Mother    • Alcohol abuse Father    • Drug abuse Father        Social history:  Social History   Substance Use Topics   • Smoking status: Former Smoker   • Smokeless tobacco: Never Used      Comment: denied ever smoking to this clinician when ask   • Alcohol use Yes      Comment: occ         Review of systems:    No hair eyes  ears nose throat skin bone joint  lymphatic hematologic or oncologic complaints no neck pain chest pain abdominal pain bowel bladder incontinence fever chills or rash    Objective:  Vitals Ranges:   Temp:  [98 °F (36.7 °C)-98.7 °F (37.1 °C)] 98.7 °F (37.1 °C)  Heart Rate:  [53-97] 72  Resp:  [17-18] 18  BP: (107-185)/(53-79) 123/76      Physical Exam:  Awake alert well oriented fund of knowledge good attention span and concentration good recent and remote memory good language function normal well-developed well-nourished in no distress pupils to constricting to 1 normal disc retinas visual fields extraocular movements full without nystagmus nasolabial folds palate tongue symmetrical normal hearing facial sensation head turning shoulder shrug motor somewhat tremulous good motor times for 70s no atrophy fasciculations rigidity or bradykinesia reflexes trace throughout symmetrical toes downgoing bilaterally sensation normal light touch face both arms and both legs coordination normal in the upper and lower extremities he's able to ambulate without ataxia heart regular without murmur neck supple without bruits extremities no clubbing cyanosis or edema visual acuity normal at 3 feet awake alert and oriented ×3    Results review:   I reviewed the patient's new clinical results.    Data review:  Lab Results (last 24 hours)     Procedure Component Value Units Date/Time    CBC & Differential [[id number]] Collected:  12/05/17 1415    Specimen:  Blood Updated:  12/05/17 1425    Narrative:       The following orders were created for panel order CBC & Differential.  Procedure                               Abnormality         Status                     ---------                               -----------         ------                     CBC Auto Differential[[id number]]        Abnormal            Final result                 Please view results for these tests on the individual orders.    CBC Auto Differential [[id number]]   (Abnormal) Collected:  12/05/17 1415    Specimen:  Blood Updated:  12/05/17 1425     WBC 14.23 (H) 10*3/mm3      RBC 5.16 10*6/mm3      Hemoglobin 15.9 g/dL      Hematocrit 45.9 %      MCV 89.0 fL      MCH 30.8 pg      MCHC 34.6 g/dL      RDW 12.2 %      RDW-SD 39.2 fl      MPV 10.0 fL      Platelets 214 10*3/mm3      Neutrophil % 84.2 (H) %      Lymphocyte % 7.9 (L) %      Monocyte % 7.5 %      Eosinophil % 0.1 (L) %      Basophil % 0.1 %      Immature Grans % 0.2 %      Neutrophils, Absolute 11.96 (H) 10*3/mm3      Lymphocytes, Absolute 1.13 10*3/mm3      Monocytes, Absolute 1.07 10*3/mm3      Eosinophils, Absolute 0.02 10*3/mm3      Basophils, Absolute 0.02 10*3/mm3      Immature Grans, Absolute 0.03 10*3/mm3     Urinalysis With / Culture If Indicated - Urine, Clean Catch [[id number]]  (Abnormal) Collected:  12/05/17 1418    Specimen:  Urine from Urine, Clean Catch Updated:  12/05/17 1434     Color, UA Yellow     Appearance, UA Clear     pH, UA 8.5 (H)     Specific Gravity, UA 1.009     Glucose, UA Negative     Ketones, UA Negative     Bilirubin, UA Negative     Blood, UA Negative     Protein, UA Negative     Leuk Esterase, UA Negative     Nitrite, UA Negative     Urobilinogen, UA 0.2 E.U./dL    Narrative:       Urine microscopic not indicated.    Comprehensive Metabolic Panel [[id number]] Collected:  12/05/17 1415    Specimen:  Blood Updated:  12/05/17 1447     Glucose 98 mg/dL      BUN 10 mg/dL      Creatinine 0.79 mg/dL      Sodium 140 mmol/L      Potassium 3.8 mmol/L      Chloride 101 mmol/L      CO2 26.3 mmol/L      Calcium 9.5 mg/dL      Total Protein 7.5 g/dL      Albumin 4.60 g/dL      ALT (SGPT) 11 U/L      AST (SGOT) 22 U/L      Alkaline Phosphatase 74 U/L      Total Bilirubin 0.5 mg/dL      eGFR Non African Amer 125 mL/min/1.73      Globulin 2.9 gm/dL      A/G Ratio 1.6 g/dL      BUN/Creatinine Ratio 12.7     Anion Gap 12.7 mmol/L     Ethanol [[id number]] Collected:  12/05/17 1413    Specimen:  Blood  Updated:  12/05/17 1447     Ethanol <10 mg/dL      Ethanol % <0.010 %     Urine Drug Screen - Urine, Clean Catch [[id number]]  (Abnormal) Collected:  12/05/17 1418    Specimen:  Urine from Urine, Clean Catch Updated:  12/05/17 1453     Amphet/Methamphet, Screen Negative     Barbiturates Screen, Urine Negative     Benzodiazepine Screen, Urine Positive (A)     Cocaine Screen, Urine Negative     Opiate Screen Negative     THC, Screen, Urine Positive (A)     Methadone Screen, Urine Negative     Oxycodone Screen, Urine Negative    Narrative:       Negative Thresholds For Drugs Screened:     Amphetamines               500 ng/ml   Barbiturates               200 ng/ml   Benzodiazepines            100 ng/ml   Cocaine                    300 ng/ml   Methadone                  300 ng/ml   Opiates                    300 ng/ml   Oxycodone                  100 ng/ml   THC                        50 ng/ml    The Normal Value for all drugs tested is negative. This report includes final unconfirmed screening results to be used for medical treatment purposes only. Unconfirmed results must not be used for non-medical purposes such as employment or legal testing. Clinical consideration should be applied to any drug of abuse test, particulary when unconfirmed results are used.    CBC (No Diff) [[id number]]  (Abnormal) Collected:  12/06/17 0528    Specimen:  Blood Updated:  12/06/17 0618     WBC 12.33 (H) 10*3/mm3      RBC 4.90 10*6/mm3      Hemoglobin 14.9 g/dL      Hematocrit 44.6 %      MCV 91.0 fL      MCH 30.4 pg      MCHC 33.4 g/dL      RDW 12.3 %      RDW-SD 41.0 fl      MPV 10.4 fL      Platelets 194 10*3/mm3     Basic Metabolic Panel [[id number]]  (Abnormal) Collected:  12/06/17 0528    Specimen:  Blood Updated:  12/06/17 0638     Glucose 96 mg/dL      BUN 8 mg/dL      Creatinine 0.74 (L) mg/dL      Sodium 141 mmol/L      Potassium 3.7 mmol/L      Chloride 105 mmol/L      CO2 26.6 mmol/L      Calcium 8.5 (L) mg/dL      eGFR Non   Amer 135 mL/min/1.73      BUN/Creatinine Ratio 10.8     Anion Gap 9.4 mmol/L     Narrative:       GFR Normal >60  Chronic Kidney Disease <60  Kidney Failure <15           Imaging:  Imaging Results (last 24 hours)     Procedure Component Value Units Date/Time    CT Head Without Contrast [[id number]] Collected:  12/05/17 1411     Updated:  12/06/17 0917    Narrative:       CT HEAD WITHOUT CONTRAST     HISTORY: Syncope, seizure. Hit back of head.     TECHNIQUE: A noncontrasted CT examination of the brain was performed.     FINDINGS:  The brain and ventricles are symmetrical. There is no  evidence of fracture or of intracranial hemorrhage. No focal area of  decreased attenuation to suggest acute infarction or contusion is  appreciated. There is no evidence of hydrocephalus, midline shift or of  abnormal extra-axial fluid.       Impression:       No acute process identified. Further evaluation with a MRI  examination of the brain is recommended, particularly if the patient has  a history of new onset seizure activity.     The above information was called to Dr. Jansen.           Radiation dose reduction techniques were utilized, including automated  exposure control and exposure modulation based on body size.     This report was finalized on 12/6/2017 9:14 AM by Dr. Thom Reyes MD.       MRI Brain With & Without Contrast [[id number]] Collected:  12/06/17 1352     Updated:  12/06/17 1352    Narrative:       MRI OF THE BRAIN WITH AND WITHOUT CONTRAST     CLINICAL HISTORY: Patient has had 2 seizures, but no history of  epilepsy.     TECHNIQUE: MRI of the brain was obtained with sagittal T1, axial pre and  postgadolinium T1, coronal postgadolinium T1, axial FLAIR, axial T2,  axial diffusion, axial gradient echo, and axial 3D SPGR images.  Additionally, there are thin cut coronal T2-weighted images through the  mesial temporal structures.     COMPARISON: Comparison is made to prior CT scan head dated 12/05/2017.      FINDINGS:     The study is somewhat compromised by motion artifact. The midline  intracranial anatomy is unremarkable. There are no abnormal areas of  contrast enhancement.       Impression:          No evidence for acute intrapelvic pathology. Also, no potential seizure  focus is identified. The ventricles, sulci, and cisterns are age  appropriate. The major intracranial flow related signal voids are within  normal limits. The mesial temporal structures are symmetric in signal  intensity and volume. There are no abnormal areas of susceptibility  artifact within the brain parenchyma. There are no abnormal areas of  restricted diffusion. The midline intracranial anatomy is within normal  limits. No potential congenital seizure foci are visualized.                Assessment and Plan:     Principal Problem:    New onset seizure  Active Problems:    Eczema    Benzodiazepine withdrawal with complication    Benzodiazepine abuse    Leukocytosis  This is a classic benzodiazepine withdrawal seizure in a patient who abuses drugs of all varieties.  At this point he is CT is negative their checking an MRI scan but I would see no indication to give this patient seizure medication or to provide further workup.  There is no evidence of stroke TIA or primary seizure disorder given this history and presentation.  He will not drive until given clearance by an outpatient M.D.      Nacho Faust MD  12/06/17  2:14 PM   Oriented - self; Oriented - place; Oriented - time/Ideation - suicidal